# Patient Record
Sex: MALE | Race: WHITE | NOT HISPANIC OR LATINO | Employment: OTHER | URBAN - METROPOLITAN AREA
[De-identification: names, ages, dates, MRNs, and addresses within clinical notes are randomized per-mention and may not be internally consistent; named-entity substitution may affect disease eponyms.]

---

## 2017-01-13 ENCOUNTER — HOSPITAL ENCOUNTER (OUTPATIENT)
Dept: NUCLEAR MEDICINE | Facility: HOSPITAL | Age: 62
Discharge: HOME | End: 2017-01-13
Attending: INTERNAL MEDICINE | Admitting: INTERNAL MEDICINE

## 2019-04-04 ENCOUNTER — HOSPITAL ENCOUNTER (OUTPATIENT)
Dept: GENERAL RADIOLOGY | Facility: HOSPITAL | Age: 64
Discharge: HOME | End: 2019-04-04
Attending: PAIN MEDICINE | Admitting: PAIN MEDICINE

## 2019-11-25 ENCOUNTER — TRANSCRIBE ORDERS (OUTPATIENT)
Dept: ADMINISTRATIVE | Facility: HOSPITAL | Age: 64
End: 2019-11-25

## 2019-11-25 DIAGNOSIS — M47.816 LUMBAR SPONDYLOSIS: ICD-10-CM

## 2019-11-25 DIAGNOSIS — M54.16 LUMBAR RADICULOPATHY: Primary | ICD-10-CM

## 2020-11-03 ENCOUNTER — HOSPITAL ENCOUNTER (INPATIENT)
Facility: HOSPITAL | Age: 65
LOS: 3 days | Discharge: HOME | End: 2020-11-06
Attending: EMERGENCY MEDICINE | Admitting: HOSPITALIST

## 2020-11-03 ENCOUNTER — APPOINTMENT (OUTPATIENT)
Dept: CT IMAGING | Facility: HOSPITAL | Age: 65
End: 2020-11-03

## 2020-11-03 ENCOUNTER — APPOINTMENT (OUTPATIENT)
Dept: GENERAL RADIOLOGY | Facility: HOSPITAL | Age: 65
End: 2020-11-03

## 2020-11-03 DIAGNOSIS — N17.9 SEPSIS WITH ACUTE RENAL FAILURE WITHOUT SEPTIC SHOCK, DUE TO UNSPECIFIED ORGANISM, UNSPECIFIED ACUTE RENAL FAILURE TYPE (HCC): ICD-10-CM

## 2020-11-03 DIAGNOSIS — I24.0 ACUTE CORONARY THROMBOSIS NOT RESULTING IN MYOCARDIAL INFARCTION (HCC): ICD-10-CM

## 2020-11-03 DIAGNOSIS — N17.9 AKI (ACUTE KIDNEY INJURY) (HCC): Primary | ICD-10-CM

## 2020-11-03 DIAGNOSIS — R65.20 SEPSIS WITH ACUTE RENAL FAILURE WITHOUT SEPTIC SHOCK, DUE TO UNSPECIFIED ORGANISM, UNSPECIFIED ACUTE RENAL FAILURE TYPE (HCC): ICD-10-CM

## 2020-11-03 DIAGNOSIS — I48.20 CHRONIC ATRIAL FIBRILLATION (HCC): ICD-10-CM

## 2020-11-03 DIAGNOSIS — A41.9 SEPSIS WITH ACUTE RENAL FAILURE WITHOUT SEPTIC SHOCK, DUE TO UNSPECIFIED ORGANISM, UNSPECIFIED ACUTE RENAL FAILURE TYPE (HCC): ICD-10-CM

## 2020-11-03 DIAGNOSIS — U07.1 COVID-19 VIRUS INFECTION: ICD-10-CM

## 2020-11-03 DIAGNOSIS — R77.8 ELEVATED TROPONIN: ICD-10-CM

## 2020-11-03 PROBLEM — M62.82 RHABDOMYOLYSIS: Status: ACTIVE | Noted: 2020-11-03

## 2020-11-03 LAB
ALBUMIN SERPL-MCNC: 4.8 G/DL (ref 3.5–5.2)
ALBUMIN/GLOB SERPL: 1.7 G/DL
ALP SERPL-CCNC: 83 U/L (ref 39–117)
ALT SERPL W P-5'-P-CCNC: 16 U/L (ref 1–41)
AMORPH URATE CRY URNS QL MICRO: ABNORMAL /HPF
AMPHET+METHAMPHET UR QL: NEGATIVE
ANION GAP SERPL CALCULATED.3IONS-SCNC: 16.8 MMOL/L (ref 5–15)
APAP SERPL-MCNC: <5 MCG/ML (ref 0–30)
AST SERPL-CCNC: 37 U/L (ref 1–40)
B PARAPERT DNA SPEC QL NAA+PROBE: NOT DETECTED
B PERT DNA SPEC QL NAA+PROBE: NOT DETECTED
BACTERIA UR QL AUTO: ABNORMAL /HPF
BARBITURATES UR QL SCN: NEGATIVE
BASOPHILS # BLD AUTO: 0.01 10*3/MM3 (ref 0–0.2)
BASOPHILS NFR BLD AUTO: 0.1 % (ref 0–1.5)
BENZODIAZ UR QL SCN: NEGATIVE
BILIRUB SERPL-MCNC: 0.3 MG/DL (ref 0–1.2)
BILIRUB UR QL STRIP: NEGATIVE
BUN SERPL-MCNC: 48 MG/DL (ref 8–23)
BUN/CREAT SERPL: 11.9 (ref 7–25)
C PNEUM DNA NPH QL NAA+NON-PROBE: NOT DETECTED
CALCIUM SPEC-SCNC: 9 MG/DL (ref 8.6–10.5)
CANNABINOIDS SERPL QL: NEGATIVE
CHLORIDE SERPL-SCNC: 97 MMOL/L (ref 98–107)
CK SERPL-CCNC: 1588 U/L (ref 20–200)
CLARITY UR: ABNORMAL
CO2 SERPL-SCNC: 24.2 MMOL/L (ref 22–29)
COCAINE UR QL: NEGATIVE
COLOR UR: YELLOW
CREAT SERPL-MCNC: 4.03 MG/DL (ref 0.76–1.27)
D DIMER PPP FEU-MCNC: 0.75 MCGFEU/ML (ref 0–0.49)
D-LACTATE SERPL-SCNC: 1.2 MMOL/L (ref 0.5–2)
D-LACTATE SERPL-SCNC: 2.1 MMOL/L (ref 0.5–2)
DEPRECATED RDW RBC AUTO: 47.2 FL (ref 37–54)
EOSINOPHIL # BLD AUTO: 0.02 10*3/MM3 (ref 0–0.4)
EOSINOPHIL NFR BLD AUTO: 0.3 % (ref 0.3–6.2)
ERYTHROCYTE [DISTWIDTH] IN BLOOD BY AUTOMATED COUNT: 15.2 % (ref 12.3–15.4)
ETHANOL BLD-MCNC: <10 MG/DL (ref 0–10)
ETHANOL UR QL: <0.01 %
FERRITIN SERPL-MCNC: 54.3 NG/ML (ref 30–400)
FLUAV H1 2009 PAND RNA NPH QL NAA+PROBE: NOT DETECTED
FLUAV H1 HA GENE NPH QL NAA+PROBE: NOT DETECTED
FLUAV H3 RNA NPH QL NAA+PROBE: NOT DETECTED
FLUAV SUBTYP SPEC NAA+PROBE: NOT DETECTED
FLUBV RNA ISLT QL NAA+PROBE: NOT DETECTED
GFR SERPL CREATININE-BSD FRML MDRD: 15 ML/MIN/1.73
GLOBULIN UR ELPH-MCNC: 2.8 GM/DL
GLUCOSE BLDC GLUCOMTR-MCNC: 138 MG/DL (ref 70–130)
GLUCOSE SERPL-MCNC: 102 MG/DL (ref 65–99)
GLUCOSE UR STRIP-MCNC: NEGATIVE MG/DL
HADV DNA SPEC NAA+PROBE: NOT DETECTED
HBA1C MFR BLD: 4.74 % (ref 4.8–5.6)
HCOV 229E RNA SPEC QL NAA+PROBE: NOT DETECTED
HCOV HKU1 RNA SPEC QL NAA+PROBE: NOT DETECTED
HCOV NL63 RNA SPEC QL NAA+PROBE: NOT DETECTED
HCOV OC43 RNA SPEC QL NAA+PROBE: NOT DETECTED
HCT VFR BLD AUTO: 31.8 % (ref 37.5–51)
HGB BLD-MCNC: 10 G/DL (ref 13–17.7)
HGB UR QL STRIP.AUTO: ABNORMAL
HMPV RNA NPH QL NAA+NON-PROBE: NOT DETECTED
HPIV1 RNA SPEC QL NAA+PROBE: NOT DETECTED
HPIV2 RNA SPEC QL NAA+PROBE: NOT DETECTED
HPIV3 RNA NPH QL NAA+PROBE: NOT DETECTED
HPIV4 P GENE NPH QL NAA+PROBE: NOT DETECTED
HYALINE CASTS UR QL AUTO: ABNORMAL /LPF
IMM GRANULOCYTES # BLD AUTO: 0.03 10*3/MM3 (ref 0–0.05)
IMM GRANULOCYTES NFR BLD AUTO: 0.4 % (ref 0–0.5)
KETONES UR QL STRIP: ABNORMAL
LACTATE HOLD SPECIMEN: NORMAL
LDH SERPL-CCNC: 258 U/L (ref 135–225)
LEUKOCYTE ESTERASE UR QL STRIP.AUTO: NEGATIVE
LYMPHOCYTES # BLD AUTO: 0.74 10*3/MM3 (ref 0.7–3.1)
LYMPHOCYTES NFR BLD AUTO: 10 % (ref 19.6–45.3)
M PNEUMO IGG SER IA-ACNC: NOT DETECTED
MCH RBC QN AUTO: 26.7 PG (ref 26.6–33)
MCHC RBC AUTO-ENTMCNC: 31.4 G/DL (ref 31.5–35.7)
MCV RBC AUTO: 84.8 FL (ref 79–97)
METHADONE UR QL SCN: NEGATIVE
MONOCYTES # BLD AUTO: 0.44 10*3/MM3 (ref 0.1–0.9)
MONOCYTES NFR BLD AUTO: 6 % (ref 5–12)
NEUTROPHILS NFR BLD AUTO: 6.14 10*3/MM3 (ref 1.7–7)
NEUTROPHILS NFR BLD AUTO: 83.2 % (ref 42.7–76)
NITRITE UR QL STRIP: NEGATIVE
NRBC BLD AUTO-RTO: 0 /100 WBC (ref 0–0.2)
NT-PROBNP SERPL-MCNC: 2922 PG/ML (ref 0–900)
OPIATES UR QL: NEGATIVE
OXYCODONE UR QL SCN: POSITIVE
PH UR STRIP.AUTO: <=5 [PH] (ref 5–8)
PLATELET # BLD AUTO: 254 10*3/MM3 (ref 140–450)
PMV BLD AUTO: 10.5 FL (ref 6–12)
POTASSIUM SERPL-SCNC: 3.6 MMOL/L (ref 3.5–5.2)
PROCALCITONIN SERPL-MCNC: 1.55 NG/ML (ref 0–0.25)
PROCALCITONIN SERPL-MCNC: 1.65 NG/ML (ref 0–0.25)
PROT SERPL-MCNC: 7.6 G/DL (ref 6–8.5)
PROT UR QL STRIP: ABNORMAL
QT INTERVAL: 292 MS
RBC # BLD AUTO: 3.75 10*6/MM3 (ref 4.14–5.8)
RBC # UR: ABNORMAL /HPF
REF LAB TEST METHOD: ABNORMAL
RHINOVIRUS RNA SPEC NAA+PROBE: NOT DETECTED
RSV RNA NPH QL NAA+NON-PROBE: NOT DETECTED
SALICYLATES SERPL-MCNC: <0.3 MG/DL
SARS-COV-2 RNA NPH QL NAA+NON-PROBE: DETECTED
SODIUM SERPL-SCNC: 138 MMOL/L (ref 136–145)
SP GR UR STRIP: 1.02 (ref 1–1.03)
SQUAMOUS #/AREA URNS HPF: ABNORMAL /HPF
TROPONIN T SERPL-MCNC: 0.29 NG/ML (ref 0–0.03)
TROPONIN T SERPL-MCNC: 0.36 NG/ML (ref 0–0.03)
TROPONIN T SERPL-MCNC: 0.37 NG/ML (ref 0–0.03)
UROBILINOGEN UR QL STRIP: ABNORMAL
WBC # BLD AUTO: 7.38 10*3/MM3 (ref 3.4–10.8)
WBC UR QL AUTO: ABNORMAL /HPF

## 2020-11-03 PROCEDURE — 82962 GLUCOSE BLOOD TEST: CPT

## 2020-11-03 PROCEDURE — 87086 URINE CULTURE/COLONY COUNT: CPT | Performed by: EMERGENCY MEDICINE

## 2020-11-03 PROCEDURE — 80053 COMPREHEN METABOLIC PANEL: CPT | Performed by: EMERGENCY MEDICINE

## 2020-11-03 PROCEDURE — 99285 EMERGENCY DEPT VISIT HI MDM: CPT

## 2020-11-03 PROCEDURE — 63710000001 DEXAMETHASONE PER 0.25 MG: Performed by: HOSPITALIST

## 2020-11-03 PROCEDURE — 80307 DRUG TEST PRSMV CHEM ANLYZR: CPT | Performed by: EMERGENCY MEDICINE

## 2020-11-03 PROCEDURE — 83036 HEMOGLOBIN GLYCOSYLATED A1C: CPT | Performed by: HOSPITALIST

## 2020-11-03 PROCEDURE — 84484 ASSAY OF TROPONIN QUANT: CPT | Performed by: HOSPITALIST

## 2020-11-03 PROCEDURE — 25010000002 HEPARIN (PORCINE) PER 1000 UNITS: Performed by: HOSPITALIST

## 2020-11-03 PROCEDURE — 94799 UNLISTED PULMONARY SVC/PX: CPT

## 2020-11-03 PROCEDURE — 83615 LACTATE (LD) (LDH) ENZYME: CPT | Performed by: HOSPITALIST

## 2020-11-03 PROCEDURE — 85025 COMPLETE CBC W/AUTO DIFF WBC: CPT | Performed by: EMERGENCY MEDICINE

## 2020-11-03 PROCEDURE — 84484 ASSAY OF TROPONIN QUANT: CPT | Performed by: EMERGENCY MEDICINE

## 2020-11-03 PROCEDURE — 83880 ASSAY OF NATRIURETIC PEPTIDE: CPT | Performed by: EMERGENCY MEDICINE

## 2020-11-03 PROCEDURE — 94640 AIRWAY INHALATION TREATMENT: CPT

## 2020-11-03 PROCEDURE — 82550 ASSAY OF CK (CPK): CPT | Performed by: EMERGENCY MEDICINE

## 2020-11-03 PROCEDURE — 93005 ELECTROCARDIOGRAM TRACING: CPT | Performed by: EMERGENCY MEDICINE

## 2020-11-03 PROCEDURE — 83605 ASSAY OF LACTIC ACID: CPT | Performed by: EMERGENCY MEDICINE

## 2020-11-03 PROCEDURE — 36415 COLL VENOUS BLD VENIPUNCTURE: CPT | Performed by: HOSPITALIST

## 2020-11-03 PROCEDURE — 0202U NFCT DS 22 TRGT SARS-COV-2: CPT | Performed by: EMERGENCY MEDICINE

## 2020-11-03 PROCEDURE — 74176 CT ABD & PELVIS W/O CONTRAST: CPT

## 2020-11-03 PROCEDURE — 85379 FIBRIN DEGRADATION QUANT: CPT | Performed by: HOSPITALIST

## 2020-11-03 PROCEDURE — 93010 ELECTROCARDIOGRAM REPORT: CPT | Performed by: INTERNAL MEDICINE

## 2020-11-03 PROCEDURE — 25010000003 CEFTRIAXONE PER 250 MG: Performed by: EMERGENCY MEDICINE

## 2020-11-03 PROCEDURE — 87040 BLOOD CULTURE FOR BACTERIA: CPT | Performed by: EMERGENCY MEDICINE

## 2020-11-03 PROCEDURE — 71045 X-RAY EXAM CHEST 1 VIEW: CPT

## 2020-11-03 PROCEDURE — 84145 PROCALCITONIN (PCT): CPT | Performed by: HOSPITALIST

## 2020-11-03 PROCEDURE — 81001 URINALYSIS AUTO W/SCOPE: CPT | Performed by: EMERGENCY MEDICINE

## 2020-11-03 PROCEDURE — 82728 ASSAY OF FERRITIN: CPT | Performed by: HOSPITALIST

## 2020-11-03 PROCEDURE — 84145 PROCALCITONIN (PCT): CPT | Performed by: EMERGENCY MEDICINE

## 2020-11-03 RX ORDER — ALBUTEROL SULFATE 90 UG/1
2 AEROSOL, METERED RESPIRATORY (INHALATION) EVERY 6 HOURS PRN
Status: DISCONTINUED | OUTPATIENT
Start: 2020-11-03 | End: 2020-11-06 | Stop reason: HOSPADM

## 2020-11-03 RX ORDER — GABAPENTIN 600 MG/1
TABLET ORAL EVERY 6 HOURS
COMMUNITY
Start: 2017-01-03

## 2020-11-03 RX ORDER — SIMVASTATIN 40 MG
TABLET ORAL
COMMUNITY
Start: 2017-01-03

## 2020-11-03 RX ORDER — CEFTRIAXONE SODIUM 2 G/50ML
2 INJECTION, SOLUTION INTRAVENOUS ONCE
Status: COMPLETED | OUTPATIENT
Start: 2020-11-03 | End: 2020-11-03

## 2020-11-03 RX ORDER — CLONAZEPAM 1 MG/1
TABLET ORAL
COMMUNITY
Start: 2017-01-03

## 2020-11-03 RX ORDER — METOPROLOL SUCCINATE 50 MG/1
TABLET, EXTENDED RELEASE ORAL EVERY 12 HOURS
COMMUNITY
Start: 2018-01-06

## 2020-11-03 RX ORDER — BUDESONIDE AND FORMOTEROL FUMARATE DIHYDRATE 160; 4.5 UG/1; UG/1
2 AEROSOL RESPIRATORY (INHALATION)
Status: DISCONTINUED | OUTPATIENT
Start: 2020-11-03 | End: 2020-11-06 | Stop reason: HOSPADM

## 2020-11-03 RX ORDER — DEXTROSE MONOHYDRATE 25 G/50ML
25 INJECTION, SOLUTION INTRAVENOUS
Status: DISCONTINUED | OUTPATIENT
Start: 2020-11-03 | End: 2020-11-06 | Stop reason: HOSPADM

## 2020-11-03 RX ORDER — SODIUM CHLORIDE 0.9 % (FLUSH) 0.9 %
10 SYRINGE (ML) INJECTION AS NEEDED
Status: DISCONTINUED | OUTPATIENT
Start: 2020-11-03 | End: 2020-11-06 | Stop reason: HOSPADM

## 2020-11-03 RX ORDER — ACETAMINOPHEN 500 MG
1000 TABLET ORAL ONCE
Status: COMPLETED | OUTPATIENT
Start: 2020-11-03 | End: 2020-11-03

## 2020-11-03 RX ORDER — ALBUTEROL SULFATE 90 UG/1
AEROSOL, METERED RESPIRATORY (INHALATION)
COMMUNITY
Start: 2018-09-12

## 2020-11-03 RX ORDER — BUDESONIDE AND FORMOTEROL FUMARATE DIHYDRATE 160; 4.5 UG/1; UG/1
AEROSOL RESPIRATORY (INHALATION)
COMMUNITY
Start: 2017-01-03

## 2020-11-03 RX ORDER — CEFTRIAXONE SODIUM 2 G/50ML
2 INJECTION, SOLUTION INTRAVENOUS EVERY 24 HOURS
Status: DISCONTINUED | OUTPATIENT
Start: 2020-11-04 | End: 2020-11-04

## 2020-11-03 RX ORDER — ACETAMINOPHEN 160 MG/5ML
650 SOLUTION ORAL EVERY 4 HOURS PRN
Status: DISCONTINUED | OUTPATIENT
Start: 2020-11-03 | End: 2020-11-06 | Stop reason: HOSPADM

## 2020-11-03 RX ORDER — SODIUM CHLORIDE 0.9 % (FLUSH) 0.9 %
10 SYRINGE (ML) INJECTION EVERY 12 HOURS SCHEDULED
Status: DISCONTINUED | OUTPATIENT
Start: 2020-11-03 | End: 2020-11-06 | Stop reason: HOSPADM

## 2020-11-03 RX ORDER — ONDANSETRON 4 MG/1
4 TABLET, FILM COATED ORAL EVERY 6 HOURS PRN
Status: DISCONTINUED | OUTPATIENT
Start: 2020-11-03 | End: 2020-11-04 | Stop reason: SDUPTHER

## 2020-11-03 RX ORDER — ACETAMINOPHEN 325 MG/1
650 TABLET ORAL EVERY 4 HOURS PRN
Status: DISCONTINUED | OUTPATIENT
Start: 2020-11-03 | End: 2020-11-06 | Stop reason: HOSPADM

## 2020-11-03 RX ORDER — DILTIAZEM HYDROCHLORIDE 240 MG/1
CAPSULE, COATED, EXTENDED RELEASE ORAL
COMMUNITY
Start: 2017-01-03

## 2020-11-03 RX ORDER — VENLAFAXINE 100 MG/1
TABLET ORAL
COMMUNITY
Start: 2017-01-03

## 2020-11-03 RX ORDER — HEPARIN SODIUM 5000 [USP'U]/ML
5000 INJECTION, SOLUTION INTRAVENOUS; SUBCUTANEOUS EVERY 8 HOURS SCHEDULED
Status: DISCONTINUED | OUTPATIENT
Start: 2020-11-03 | End: 2020-11-06 | Stop reason: HOSPADM

## 2020-11-03 RX ORDER — METOPROLOL SUCCINATE 50 MG/1
50 TABLET, EXTENDED RELEASE ORAL
Status: DISCONTINUED | OUTPATIENT
Start: 2020-11-03 | End: 2020-11-06 | Stop reason: HOSPADM

## 2020-11-03 RX ORDER — ONDANSETRON 2 MG/ML
4 INJECTION INTRAMUSCULAR; INTRAVENOUS EVERY 6 HOURS PRN
Status: DISCONTINUED | OUTPATIENT
Start: 2020-11-03 | End: 2020-11-04 | Stop reason: SDUPTHER

## 2020-11-03 RX ORDER — BUDESONIDE AND FORMOTEROL FUMARATE DIHYDRATE 160; 4.5 UG/1; UG/1
2 AEROSOL RESPIRATORY (INHALATION)
Status: DISCONTINUED | OUTPATIENT
Start: 2020-11-03 | End: 2020-11-03 | Stop reason: SDUPTHER

## 2020-11-03 RX ORDER — DILTIAZEM HYDROCHLORIDE 240 MG/1
240 CAPSULE, COATED, EXTENDED RELEASE ORAL
Status: DISCONTINUED | OUTPATIENT
Start: 2020-11-03 | End: 2020-11-06 | Stop reason: HOSPADM

## 2020-11-03 RX ORDER — ACETAMINOPHEN 650 MG/1
650 SUPPOSITORY RECTAL EVERY 4 HOURS PRN
Status: DISCONTINUED | OUTPATIENT
Start: 2020-11-03 | End: 2020-11-06 | Stop reason: HOSPADM

## 2020-11-03 RX ORDER — SODIUM CHLORIDE 9 MG/ML
100 INJECTION, SOLUTION INTRAVENOUS CONTINUOUS
Status: DISCONTINUED | OUTPATIENT
Start: 2020-11-03 | End: 2020-11-06 | Stop reason: HOSPADM

## 2020-11-03 RX ORDER — ATORVASTATIN CALCIUM 20 MG/1
20 TABLET, FILM COATED ORAL DAILY
Status: DISCONTINUED | OUTPATIENT
Start: 2020-11-03 | End: 2020-11-06 | Stop reason: HOSPADM

## 2020-11-03 RX ORDER — BUMETANIDE 1 MG/1
2 TABLET ORAL EVERY 24 HOURS
COMMUNITY
Start: 2017-12-26 | End: 2020-11-06 | Stop reason: HOSPADM

## 2020-11-03 RX ORDER — NICOTINE POLACRILEX 4 MG
15 LOZENGE BUCCAL
Status: DISCONTINUED | OUTPATIENT
Start: 2020-11-03 | End: 2020-11-06 | Stop reason: HOSPADM

## 2020-11-03 RX ORDER — CYCLOBENZAPRINE HCL 10 MG
TABLET ORAL
COMMUNITY
Start: 2017-01-03

## 2020-11-03 RX ORDER — VENLAFAXINE 75 MG/1
37.5 TABLET ORAL 2 TIMES DAILY WITH MEALS
Status: DISCONTINUED | OUTPATIENT
Start: 2020-11-03 | End: 2020-11-06 | Stop reason: HOSPADM

## 2020-11-03 RX ADMIN — SODIUM CHLORIDE, POTASSIUM CHLORIDE, SODIUM LACTATE AND CALCIUM CHLORIDE 2553 ML: 600; 310; 30; 20 INJECTION, SOLUTION INTRAVENOUS at 09:47

## 2020-11-03 RX ADMIN — ACETAMINOPHEN 1000 MG: 500 TABLET, FILM COATED ORAL at 08:27

## 2020-11-03 RX ADMIN — TIOTROPIUM BROMIDE INHALATION SPRAY 2 PUFF: 3.12 SPRAY, METERED RESPIRATORY (INHALATION) at 20:44

## 2020-11-03 RX ADMIN — SODIUM CHLORIDE, PRESERVATIVE FREE 10 ML: 5 INJECTION INTRAVENOUS at 20:53

## 2020-11-03 RX ADMIN — SODIUM CHLORIDE 100 ML/HR: 9 INJECTION, SOLUTION INTRAVENOUS at 22:09

## 2020-11-03 RX ADMIN — CEFTRIAXONE SODIUM 2 G: 2 INJECTION, SOLUTION INTRAVENOUS at 09:58

## 2020-11-03 RX ADMIN — BUDESONIDE AND FORMOTEROL FUMARATE DIHYDRATE 2 PUFF: 160; 4.5 AEROSOL RESPIRATORY (INHALATION) at 20:44

## 2020-11-03 RX ADMIN — HEPARIN SODIUM 5000 UNITS: 5000 INJECTION INTRAVENOUS; SUBCUTANEOUS at 21:06

## 2020-11-03 NOTE — ED NOTES
Patient was placed in face mask during first look triage.  Patient was wearing a face mask throughout encounter.  I wore personal protective equipment throughout the encounter.  Hand hygiene was performed before and after patient encounter.        Cathy Nguyễn RN  11/03/20 2003

## 2020-11-03 NOTE — H&P
HISTORY AND PHYSICAL   [facility]        Patient Identification:  Name: Mulugeta Falcon  Age: 65 y.o.  Sex: male  :  1955  MRN: [id number]                     Primary Care Physician: Provider, No Known    Chief Complaint: Altered mental status    History of Present Illness:          The patient is a 65-year-old white male with history of A. fib, COPD, diabetes type 2, degenerative disc disease, hypertension and history of some sort of renal disorder who is admitted with history of being found in his car sleeping in having altered mental status and EMS noted he was hypoxic with O2 sats in the 70s and blood sugar of 55.  The patient was brought to the ER for further evaluation also noted to be in renal failure with creatinine of 4.  He has been a little short of air and coughing some.  Patient also had elevated troponin and elevated CPK level.  The patient is confused is difficult to get much more useful information from him.  The patient was admitted for further treatment.    Past Medical History:  Past Medical History:   Diagnosis Date   • A-fib (CMS/HCC)    • COPD (chronic obstructive pulmonary disease) (CMS/HCC)    • Diabetes mellitus (CMS/HCC)     type 2   • Emphysema lung (CMS/HCC)    • Herniated disc, cervical    • Hypertension    • Hypoxia [id number]   • Renal disorder      Past Surgical History:  History reviewed. No pertinent surgical history.   Home Meds:  (Not in a hospital admission)    Current meds    Current Facility-Administered Medications:   •  acetaminophen (TYLENOL) tablet 650 mg, 650 mg, Oral, Q4H PRN **OR** acetaminophen (TYLENOL) 160 MG/5ML solution 650 mg, 650 mg, Oral, Q4H PRN **OR** acetaminophen (TYLENOL) suppository 650 mg, 650 mg, Rectal, Q4H PRN, Malik Barnard MD  •  [START ON 2020] cefTRIAXone (ROCEPHIN) IVPB 2 g, 2 g, Intravenous, Q24H, Malik Barnard MD  •  dexamethasone (DECADRON) tablet 6 mg, 6 mg, Oral, Daily With Breakfast, Malik Barnard MD  •  heparin  (porcine) 5000 UNIT/ML injection 5,000 Units, 5,000 Units, Subcutaneous, Q8H, Malik Barnard MD  •  ondansetron (ZOFRAN) tablet 4 mg, 4 mg, Oral, Q6H PRN **OR** ondansetron (ZOFRAN) injection 4 mg, 4 mg, Intravenous, Q6H PRN, Malik Barnard MD  •  [COMPLETED] Insert peripheral IV, , , Once **AND** sodium chloride 0.9 % flush 10 mL, 10 mL, Intravenous, PRN, Balaji Rodarte MD  •  sodium chloride 0.9 % flush 10 mL, 10 mL, Intravenous, Q12H, Malik Barnard MD  •  sodium chloride 0.9 % flush 10 mL, 10 mL, Intravenous, PRN, Malik Barnard MD  •  sodium chloride 0.9 % flush 10 mL, 10 mL, Intravenous, Q12H, Malik Barnard MD  •  sodium chloride 0.9 % flush 10 mL, 10 mL, Intravenous, PRN, Malik Barnard MD  •  sodium chloride 0.9 % infusion, 100 mL/hr, Intravenous, Continuous, Malik Barnard MD    Current Outpatient Medications:   •  albuterol sulfate HFA (Ventolin HFA) 108 (90 Base) MCG/ACT inhaler, VENTOLIN  (90 Base) MCG/ACT AERS, Disp: , Rfl:   •  budesonide-formoterol (Symbicort) 160-4.5 MCG/ACT inhaler, SYMBICORT 160-4.5 MCG/ACT AERO, Disp: , Rfl:   •  bumetanide (BUMEX) 1 MG tablet, Daily., Disp: , Rfl:   •  clonazePAM (KlonoPIN) 1 MG tablet, CLONAZEPAM 1 MG TABS, Disp: , Rfl:   •  cyclobenzaprine (FLEXERIL) 10 MG tablet, CYCLOBENZAPRINE HCL 10 MG TABS, Disp: , Rfl:   •  dilTIAZem CD (Cartia XT) 240 MG 24 hr capsule, CARTIA  MG XR24H-CAP, Disp: , Rfl:   •  gabapentin (NEURONTIN) 600 MG tablet, Every 6 (Six) Hours., Disp: , Rfl:   •  metFORMIN (GLUCOPHAGE) 1000 MG tablet, METFORMIN HCL 1000 MG TABS, Disp: , Rfl:   •  methotrexate 2.5 MG tablet, METHOTREXATE 2.5 MG TABS, Disp: , Rfl:   •  metoprolol succinate XL (TOPROL-XL) 50 MG 24 hr tablet, Every 12 (Twelve) Hours., Disp: , Rfl:   •  mometasone-formoterol (Dulera) 100-5 MCG/ACT inhaler, DULERA 100-5 MCG/ACT AERO, Disp: , Rfl:   •  simvastatin (ZOCOR) 40 MG tablet, SIMVASTATIN 40 MG TABS, Disp: , Rfl:   •  venlafaxine (EFFEXOR) 100 MG tablet,  "VENLAFAXINE  MG TABS, Disp: , Rfl:   Allergies:  No Known Allergies  Immunizations:    There is no immunization history on file for this patient.  Social History:   Social History     Social History Narrative   • Not on file     Social History     Socioeconomic History   • Marital status: Legally      Spouse name: Not on file   • Number of children: Not on file   • Years of education: Not on file   • Highest education level: Not on file   Tobacco Use   • Smoking status: Former Smoker   • Smokeless tobacco: Never Used   Substance and Sexual Activity   • Alcohol use: Not Currently     Comment: drank for 20 years, sober now    • Drug use: Never   • Sexual activity: Defer       Family History:  History reviewed. No pertinent family history.     Review of Systems  See history of present illness and past medical history.  Patient denies headache, dizziness, syncope, falls, trauma, change in vision, change in hearing, change in taste, changes in weight, changes in appetite, focal weakness, numbness, or paresthesia.  Patient denies chest pain, palpitations, dyspnea, orthopnea, PND, cough, sinus pressure, rhinorrhea, epistaxis, hemoptysis, nausea, vomiting, hematemesis, diarrhea, constipation or hematchezia.  Denies cold or heat intolerance, polydipsia, polyuria, polyphagia. Denies hematuria, pyuria, dysuria, hesitancy, frequency or urgency.    Remainder of ROS is negative.    Objective:  tMax 24 hrs: Temp (24hrs), Av °F (38.3 °C), Min:100.9 °F (38.3 °C), Max:101.2 °F (38.4 °C)    Vitals Ranges:   Temp:  [100.9 °F (38.3 °C)-101.2 °F (38.4 °C)] 101 °F (38.3 °C)  Heart Rate:  [] 118  Resp:  [18] 18  BP: ()/(50-74) 111/74      Exam:  /74   Pulse 118   Temp (!) 101 °F (38.3 °C) (Oral)   Resp 18   Ht 157.5 cm (62\")   Wt 85.1 kg (187 lb 11.2 oz)   SpO2 100%   BMI 34.33 kg/m²     General Appearance:    Alert, cooperative, no distress, appears stated age   Head:    Normocephalic, without " obvious abnormality, atraumatic   Eyes:    PERRL, conjunctivae/corneas clear, EOM's intact, both eyes   Ears:    Normal external ear canals, both ears   Nose:   Nares normal, septum midline, mucosa normal, no drainage    or sinus tenderness   Throat:   Lips, mucosa, and tongue normal   Neck:   Supple, symmetrical, trachea midline, no adenopathy;     thyroid:  no enlargement/tenderness/nodules; no carotid    bruit or JVD   Back:     Symmetric, no curvature, ROM normal, no CVA tenderness   Lungs:     Clear to auscultation bilaterally, respirations unlabored   Chest Wall:    No tenderness or deformity    Heart:    Regular rate and rhythm, S1 and S2 normal, no murmur, rub   or gallop   Abdomen:     Soft, nontender, bowel sounds active all four quadrants,     no masses, no hepatomegaly, no splenomegaly   Extremities:   Extremities normal, atraumatic, no cyanosis or edema   Pulses:   2+ and symmetric all extremities   Skin:   Skin color, texture, turgor normal, no rashes or lesions   Lymph nodes:   Cervical, supraclavicular, and axillary nodes normal   Neurologic:   CNII-XII intact, normal strength, sensation intact throughout      .    Data Review:  Lab Results (last 72 hours)     Procedure Component Value Units Date/Time    Lactic Acid, Reflex [[id number]]  (Normal) Collected: 11/03/20 1409    Specimen: Blood Updated: 11/03/20 1503     Lactate 1.2 mmol/L     Lactic Acid, Reflex Timer (This will reflex a repeat order 3-3:15 hours after ordered.) [[id number]] Collected: 11/03/20 0825    Specimen: Blood Updated: 11/03/20 1200     Hold Tube Hold for add-ons.     Comment: Auto resulted.       Troponin [[id number]]  (Abnormal) Collected: 11/03/20 0952    Specimen: Blood Updated: 11/03/20 1134     Troponin T 0.291 ng/mL     Narrative:      Troponin T Reference Range:  <= 0.03 ng/mL-   Negative for AMI  >0.03 ng/mL-     Abnormal for myocardial necrosis.  Clinicians would have to utilize clinical acumen, EKG, Troponin and serial  changes to determine if it is an Acute Myocardial Infarction or myocardial injury due to an underlying chronic condition.       Results may be falsely decreased if patient taking Biotin.      Urinalysis, Microscopic Only - Urine, Clean Catch [[id number]]  (Abnormal) Collected: 11/03/20 0813    Specimen: Urine, Clean Catch Updated: 11/03/20 1028     RBC, UA 0-2 /HPF      WBC, UA 6-12 /HPF      Bacteria, UA 1+ /HPF      Squamous Epithelial Cells, UA 0-2 /HPF      Hyaline Casts, UA None Seen /LPF      Amorphous Crystals, UA Small/1+ /HPF      Methodology Manual Light Microscopy    Urine Culture - Urine, Urine, Clean Catch [[id number]] Collected: 11/03/20 0813    Specimen: Urine, Clean Catch Updated: 11/03/20 1028    Respiratory Panel PCR w/COVID-19(SARS-CoV-2) ANH/EFRAIN/GEO/PAD/COR/MAD/PACHECO In-House, NP Swab in UTM/VTM, 3-4 HR TAT - Swab, Nasopharynx [[id number]]  (Abnormal) Collected: 11/03/20 0825    Specimen: Swab from Nasopharynx Updated: 11/03/20 1009     ADENOVIRUS, PCR Not Detected     Coronavirus 229E Not Detected     Coronavirus HKU1 Not Detected     Coronavirus NL63 Not Detected     Coronavirus OC43 Not Detected     COVID19 Detected     Human Metapneumovirus Not Detected     Human Rhinovirus/Enterovirus Not Detected     Influenza A PCR Not Detected     Influenza A H1 Not Detected     Influenza A H1 2009 PCR Not Detected     Influenza A H3 Not Detected     Influenza B PCR Not Detected     Parainfluenza Virus 1 Not Detected     Parainfluenza Virus 2 Not Detected     Parainfluenza Virus 3 Not Detected     Parainfluenza Virus 4 Not Detected     RSV, PCR Not Detected     Bordetella pertussis pcr Not Detected     Bordetella parapertussis PCR Not Detected     Chlamydophila pneumoniae PCR Not Detected     Mycoplasma pneumo by PCR Not Detected    Narrative:      Fact sheet for providers: https://docs.[employer]/wp-content/uploads/TQQ8952-3839-UG9.1-EUA-Provider-Fact-Sheet-3.pdf    Fact sheet for patients:  "https://docs.[employer]/wp-content/uploads/YXA2941-9290-AY6.1-EUA-Patient-Fact-Sheet-1.pdf    Urinalysis With Culture If Indicated - Urine, Clean Catch [[id number]]  (Abnormal) Collected: 11/03/20 0813    Specimen: Urine, Clean Catch Updated: 11/03/20 0959     Color, UA Yellow     Appearance, UA Cloudy     pH, UA <=5.0     Specific Gravity, UA 1.016     Glucose, UA Negative     Ketones, UA Trace     Bilirubin, UA Negative     Blood, UA Moderate (2+)     Protein, UA 30 mg/dL (1+)     Leuk Esterase, UA Negative     Nitrite, UA Negative     Urobilinogen, UA 0.2 E.U./dL    CK [[id number]]  (Abnormal) Collected: 11/03/20 0825    Specimen: Blood Updated: 11/03/20 0951     Creatine Kinase 1,588 U/L     Procalcitonin [[id number]]  (Abnormal) Collected: 11/03/20 0825    Specimen: Blood Updated: 11/03/20 0924     Procalcitonin 1.65 ng/mL     Narrative:      As a Marker for Sepsis (Non-Neonates):   1. <0.5 ng/mL represents a low risk of severe sepsis and/or septic shock.  1. >2 ng/mL represents a high risk of severe sepsis and/or septic shock.    As a Marker for Lower Respiratory Tract Infections that require antibiotic therapy:  PCT on Admission     Antibiotic Therapy             6-12 Hrs later  > 0.5                Strongly Recommended            >0.25 - <0.5         Recommended  0.1 - 0.25           Discouraged                   Remeasure/reassess PCT  <0.1                 Strongly Discouraged          Remeasure/reassess PCT      As 28 day mortality risk marker: \"Change in Procalcitonin Result\" (> 80 % or <=80 %) if Day 0 (or Day 1) and Day 4 values are available. Refer to http://www.[facility]-pct-calculator.com/   Change in PCT <=80 %   A decrease of PCT levels below or equal to 80 % defines a positive change in PCT test result representing a higher risk for 28-day all-cause mortality of patients diagnosed with severe sepsis or septic shock.  Change in PCT > 80 %   A decrease of PCT levels of more than 80 % defines a negative " change in PCT result representing a lower risk for 28-day all-cause mortality of patients diagnosed with severe sepsis or septic shock.                Results may be falsely decreased if patient taking Biotin.     Troponin [[id number]]  (Abnormal) Collected: 11/03/20 0825    Specimen: Blood Updated: 11/03/20 0921     Troponin T 0.369 ng/mL     Narrative:      Troponin T Reference Range:  <= 0.03 ng/mL-   Negative for AMI  >0.03 ng/mL-     Abnormal for myocardial necrosis.  Clinicians would have to utilize clinical acumen, EKG, Troponin and serial changes to determine if it is an Acute Myocardial Infarction or myocardial injury due to an underlying chronic condition.       Results may be falsely decreased if patient taking Biotin.      Comprehensive Metabolic Panel [[id number]]  (Abnormal) Collected: 11/03/20 0825    Specimen: Blood Updated: 11/03/20 0920     Glucose 102 mg/dL      BUN 48 mg/dL      Creatinine 4.03 mg/dL      Sodium 138 mmol/L      Potassium 3.6 mmol/L      Chloride 97 mmol/L      CO2 24.2 mmol/L      Calcium 9.0 mg/dL      Total Protein 7.6 g/dL      Albumin 4.80 g/dL      ALT (SGPT) 16 U/L      AST (SGOT) 37 U/L      Alkaline Phosphatase 83 U/L      Total Bilirubin 0.3 mg/dL      eGFR Non African Amer 15 mL/min/1.73      Globulin 2.8 gm/dL      A/G Ratio 1.7 g/dL      BUN/Creatinine Ratio 11.9     Anion Gap 16.8 mmol/L     Narrative:      GFR Normal >60  Chronic Kidney Disease <60  Kidney Failure <15      Acetaminophen Level [[id number]]  (Normal) Collected: 11/03/20 0825    Specimen: Blood Updated: 11/03/20 0920     Acetaminophen <5.0 mcg/mL     Ethanol [[id number]] Collected: 11/03/20 0825    Specimen: Blood Updated: 11/03/20 0920     Ethanol <10 mg/dL      Ethanol % <0.010 %     Salicylate Level [[id number]]  (Normal) Collected: 11/03/20 0825    Specimen: Blood Updated: 11/03/20 0920     Salicylate <0.3 mg/dL     Narrative:      Therapeutic range for Salicylates:  3.0 - 10.0 mg/dL for  antipyretic/analgesic conditions  15.0 - 30.0 mg/dL for anti-inflammatory conditions    BNP [[id number]]  (Abnormal) Collected: 11/03/20 0825    Specimen: Blood Updated: 11/03/20 0917     proBNP 2,922.0 pg/mL     Narrative:      Among patients with dyspnea, NT-proBNP is highly sensitive for the detection of acute congestive heart failure. In addition NT-proBNP of <300 pg/ml effectively rules out acute congestive heart failure with 99% negative predictive value.    Results may be falsely decreased if patient taking Biotin.      Lactic Acid, Plasma [[id number]]  (Abnormal) Collected: 11/03/20 0825    Specimen: Blood Updated: 11/03/20 0859     Lactate 2.1 mmol/L     Urine Drug Screen - Urine, Clean Catch [[id number]]  (Abnormal) Collected: 11/03/20 0813    Specimen: Urine, Clean Catch Updated: 11/03/20 0856     Amphet/Methamphet, Screen Negative     Barbiturates Screen, Urine Negative     Benzodiazepine Screen, Urine Negative     Cocaine Screen, Urine Negative     Opiate Screen Negative     THC, Screen, Urine Negative     Methadone Screen, Urine Negative     Oxycodone Screen, Urine Positive    Narrative:      Negative Thresholds For Drugs Screened:     Amphetamines               500 ng/ml   Barbiturates               200 ng/ml   Benzodiazepines            100 ng/ml   Cocaine                    300 ng/ml   Methadone                  300 ng/ml   Opiates                    300 ng/ml   Oxycodone                  100 ng/ml   THC                        50 ng/ml    The Normal Value for all drugs tested is negative. This report includes final unconfirmed screening results to be used for medical treatment purposes only. Unconfirmed results must not be used for non-medical purposes such as employment or legal testing. Clinical consideration should be applied to any drug of abuse test, particulary when unconfirmed results are used.    CBC & Differential [[id number]]  (Abnormal) Collected: 11/03/20 0825    Specimen: Blood Updated:  11/03/20 0851    Narrative:      The following orders were created for panel order CBC & Differential.  Procedure                               Abnormality         Status                     ---------                               -----------         ------                     CBC Auto Differential[[id number]]        Abnormal            Final result                 Please view results for these tests on the individual orders.    CBC Auto Differential [[id number]]  (Abnormal) Collected: 11/03/20 0825    Specimen: Blood Updated: 11/03/20 0851     WBC 7.38 10*3/mm3      RBC 3.75 10*6/mm3      Hemoglobin 10.0 g/dL      Hematocrit 31.8 %      MCV 84.8 fL      MCH 26.7 pg      MCHC 31.4 g/dL      RDW 15.2 %      RDW-SD 47.2 fl      MPV 10.5 fL      Platelets 254 10*3/mm3      Neutrophil % 83.2 %      Lymphocyte % 10.0 %      Monocyte % 6.0 %      Eosinophil % 0.3 %      Basophil % 0.1 %      Immature Grans % 0.4 %      Neutrophils, Absolute 6.14 10*3/mm3      Lymphocytes, Absolute 0.74 10*3/mm3      Monocytes, Absolute 0.44 10*3/mm3      Eosinophils, Absolute 0.02 10*3/mm3      Basophils, Absolute 0.01 10*3/mm3      Immature Grans, Absolute 0.03 10*3/mm3      nRBC 0.0 /100 WBC     Blood Culture - Blood, Arm, Left [[id number]] Collected: 11/03/20 0836    Specimen: Blood from Arm, Left Updated: 11/03/20 0843    Blood Culture - Blood, Arm, Left [[id number]] Collected: 11/03/20 0825    Specimen: Blood from Arm, Left Updated: 11/03/20 0843                   Imaging Results (All)     Procedure Component Value Units Date/Time    CT Abdomen Pelvis Without Contrast [[id number]] Collected: 11/03/20 1153     Updated: 11/03/20 1153    Narrative:      CT ABDOMEN AND PELVIS WITHOUT CONTRAST     HISTORY: Fever, acute renal failure. To assess for calculus.     TECHNIQUE: Axial CT images of the abdomen and pelvis were obtained  without administration of intravenous contrast. The patient was not  given oral contrast. Coronal and sagittal  reformats were obtained.     COMPARISON: None     FINDINGS: Bilateral adrenal glands are normal. Both kidneys are normal  in size and attenuation. Punctate 3 mm calculus is seen within the lower  pole of the right kidney. No hydronephrosis. The right ureter  demonstrates normal caliber. 2 punctate nonobstructing calculi, the  larger measuring 4 mm also seen at the lower pole of the left kidney. No  hydronephrosis. The left ureter demonstrates normal caliber. The urinary  bladder is moderately distended and otherwise unremarkable. Mild diffuse  hepatic steatosis is seen. The gallbladder is distended. The pancreas is  normal without ductal dilatation. The spleen is normal. The small and  large bowel loops demonstrate normal caliber. The appendix is normal. No  pathological retroperitoneal lymphadenopathy. Moderate calcified  atherosclerotic plaque is seen within the abdominal aorta and its  branches. Small fat-containing umbilical hernia is present. Discoid  atelectasis is seen within the right lung base. Degenerative disc  disease is seen in the spine with vacuum disc phenomenon at multiple  levels.       Impression:      Bilateral nonobstructing punctate nephroliths.     Radiation dose reduction techniques were utilized, including automated  exposure control and exposure modulation based on body size.       XR Chest 1 View [[id number]] Collected: 11/03/20 0811     Updated: 11/03/20 0815    Narrative:      XR CHEST 1 VW-     Clinical: Fever and shortness of breath     COMPARISON: None     FINDINGS: Cardiac size upper limits of normal to mildly enlarged. No  edema or effusion is demonstrated. Less than optimal inspiratory effort,  minimal patchy infiltrate or atelectasis demonstrated at the right lung  base. Favor atelectasis. No gross consolidation seen. There are  monitoring leads superimposing the chest, the remainder is unremarkable.     This report was finalized on 11/3/2020 8:12 AM by Dr. Jonathan Wong M.D.             Past Medical History:   Diagnosis Date   • A-fib (CMS/HCC)    • COPD (chronic obstructive pulmonary disease) (CMS/HCC)    • Diabetes mellitus (CMS/HCC)     type 2   • Emphysema lung (CMS/HCC)    • Herniated disc, cervical    • Hypertension    • Hypoxia [id number]   • Renal disorder        Assessment:  Active Hospital Problems    Diagnosis  POA   • **KRISTIE (acute kidney injury) (CMS/[facility]) [N17.9]  Yes   • Rhabdomyolysis [M62.82]  Unknown   • A-fib (CMS/[facility]) [I48.91]  Unknown   • COPD (chronic obstructive pulmonary disease) (CMS/HCC) [J44.9]  Unknown   • Hypertension [I10]  Unknown   • Diabetes mellitus (CMS/[facility]) [E11.9]  Unknown   • Renal disorder [N28.9]  Unknown   • Hypoxia [R09.02]  Unknown   • UTI (urinary tract infection) [N39.0]  Unknown   • Elevated troponin [R77.8]  Unknown   • Pneumonia due to COVID-19 virus [U07.1, J12.89]  Unknown   • Acute respiratory failure with hypoxia (CMS/[facility]) [J96.01]  Unknown      Resolved Hospital Problems   No resolved problems to display.       Plan:  The patient is admitted to the hospital and will ask for renal, pulmonary, infectious disease and cardiology consults.  We will give some IV fluid for hydration and follow-up labs.    Malik Barnard MD  11/3/2020  16:33 EST

## 2020-11-03 NOTE — ED PROVIDER NOTES
EMERGENCY DEPARTMENT ENCOUNTER    Room Number:  10/10  Date seen:  11/3/2020  PCP: Provider, No Known  Historian: Patient, EMS      HPI:  Chief Complaint: Altered mental status  A complete HPI/ROS/PMH/PSH/SH/FH are unobtainable due to: Nothing  Context: Mulugeta Falcon is a 65 y.o. male who presents to the ED for evaluation of altered mental status.  EMS received a call due to a confused man in a vehicle in a parking lot.  Upon EMS arrival they report that the patient would not follow commands, had slurred speech.  When placed on a monitor, oxygen was 76% on room air.  Blood glucose was also 55.  EMS was able to get in touch with his spouse who reports that they have not seen him in a few days.  They also report that he has a history of COPD, TIA, medication noncompliance.  EMS reports that he did have a portable oxygen tank with him but was not utilizing it at this time that they found him.  Patient is amnestic to why he was in his car.  He believes he may have fallen asleep.  He reports that he may have been in the area going to a consignment store.            PAST MEDICAL HISTORY  Active Ambulatory Problems     Diagnosis Date Noted   • No Active Ambulatory Problems     Resolved Ambulatory Problems     Diagnosis Date Noted   • No Resolved Ambulatory Problems     Past Medical History:   Diagnosis Date   • A-fib (CMS/[facility])    • COPD (chronic obstructive pulmonary disease) (CMS/[facility])    • Diabetes mellitus (CMS/HCC)    • Emphysema lung (CMS/[facility])    • Herniated disc, cervical    • Hypertension    • Hypoxia [id number]   • Renal disorder          PAST SURGICAL HISTORY  History reviewed. No pertinent surgical history.      FAMILY HISTORY  History reviewed. No pertinent family history.      SOCIAL HISTORY  Social History     Socioeconomic History   • Marital status: Legally      Spouse name: Not on file   • Number of children: Not on file   • Years of education: Not on file   • Highest education level: Not on file    Tobacco Use   • Smoking status: Former Smoker   • Smokeless tobacco: Never Used   Substance and Sexual Activity   • Alcohol use: Not Currently     Comment: drank for 20 years, sober now    • Drug use: Never   • Sexual activity: Defer         ALLERGIES  Patient has no known allergies.        REVIEW OF SYSTEMS  Review of Systems   Review of all 14 systems is negative other than stated in the HPI above.      PHYSICAL EXAM  ED Triage Vitals [11/03/20 0741]   Temp Heart Rate Resp BP SpO2   (!) 101.2 °F (38.4 °C) 92 18 101/60 100 %      Temp src Heart Rate Source Patient Position BP Location FiO2 (%)   Tympanic Monitor Sitting Right arm --         GENERAL: Awake and alert, no acute distress, poor historian  HENT: nares patent  EYES: no scleral icterus, pupils 3 mm reactive bilaterally  CV: regular rhythm, mild tachycardia  RESPIRATORY: normal effort  ABDOMEN: soft, mild abdominal tenderness without rebound or guarding.  Diastases recti present, small umbilical hernia present that is easily reducible.  MUSCULOSKELETAL: no deformity  NEURO: alert, moves all extremities, follows commands, speech is slightly slurred.  Patient is a poor historian.  PSYCH:  calm, cooperative  SKIN: warm, dry    Vital signs and nursing notes reviewed.          LAB RESULTS  Recent Results (from the past 24 hour(s))   Urine Drug Screen - Urine, Clean Catch    Collection Time: 11/03/20  8:13 AM    Specimen: Urine, Clean Catch   Result Value Ref Range    Amphet/Methamphet, Screen Negative Negative    Barbiturates Screen, Urine Negative Negative    Benzodiazepine Screen, Urine Negative Negative    Cocaine Screen, Urine Negative Negative    Opiate Screen Negative Negative    THC, Screen, Urine Negative Negative    Methadone Screen, Urine Negative Negative    Oxycodone Screen, Urine Positive (A) Negative   Urinalysis With Culture If Indicated - Urine, Clean Catch    Collection Time: 11/03/20  8:13 AM    Specimen: Urine, Clean Catch   Result Value Ref  Range    Color, UA Yellow Yellow, Straw    Appearance, UA Cloudy (A) Clear    pH, UA <=5.0 5.0 - 8.0    Specific Gravity, UA 1.016 1.005 - 1.030    Glucose, UA Negative Negative    Ketones, UA Trace (A) Negative    Bilirubin, UA Negative Negative    Blood, UA Moderate (2+) (A) Negative    Protein, UA 30 mg/dL (1+) (A) Negative    Leuk Esterase, UA Negative Negative    Nitrite, UA Negative Negative    Urobilinogen, UA 0.2 E.U./dL 0.2 - 1.0 E.U./dL   Urinalysis, Microscopic Only - Urine, Clean Catch    Collection Time: 11/03/20  8:13 AM    Specimen: Urine, Clean Catch   Result Value Ref Range    RBC, UA 0-2 None Seen, 0-2 /HPF    WBC, UA 6-12 (A) None Seen, 0-2 /HPF    Bacteria, UA 1+ (A) None Seen /HPF    Squamous Epithelial Cells, UA 0-2 None Seen, 0-2 /HPF    Hyaline Casts, UA None Seen None Seen /LPF    Amorphous Crystals, UA Small/1+ None Seen /HPF    Methodology Manual Light Microscopy    ECG 12 Lead    Collection Time: 11/03/20  8:14 AM   Result Value Ref Range    QT Interval 292 ms   Respiratory Panel PCR w/COVID-19(SARS-CoV-2) ANH/EFRAIN/GEO/PAD/COR/MAD/PACHECO In-House, NP Swab in UTM/VTM, 3-4 HR TAT - Swab, Nasopharynx    Collection Time: 11/03/20  8:25 AM    Specimen: Nasopharynx; Swab   Result Value Ref Range    ADENOVIRUS, PCR Not Detected Not Detected    Coronavirus 229E Not Detected Not Detected    Coronavirus HKU1 Not Detected Not Detected    Coronavirus NL63 Not Detected Not Detected    Coronavirus OC43 Not Detected Not Detected    COVID19 Detected (C) Not Detected - Ref. Range    Human Metapneumovirus Not Detected Not Detected    Human Rhinovirus/Enterovirus Not Detected Not Detected    Influenza A PCR Not Detected Not Detected    Influenza A H1 Not Detected Not Detected    Influenza A H1 2009 PCR Not Detected Not Detected    Influenza A H3 Not Detected Not Detected    Influenza B PCR Not Detected Not Detected    Parainfluenza Virus 1 Not Detected Not Detected    Parainfluenza Virus 2 Not Detected Not  Detected    Parainfluenza Virus 3 Not Detected Not Detected    Parainfluenza Virus 4 Not Detected Not Detected    RSV, PCR Not Detected Not Detected    Bordetella pertussis pcr Not Detected Not Detected    Bordetella parapertussis PCR Not Detected Not Detected    Chlamydophila pneumoniae PCR Not Detected Not Detected    Mycoplasma pneumo by PCR Not Detected Not Detected   Comprehensive Metabolic Panel    Collection Time: 11/03/20  8:25 AM    Specimen: Blood   Result Value Ref Range    Glucose 102 (H) 65 - 99 mg/dL    BUN 48 (H) 8 - 23 mg/dL    Creatinine 4.03 (H) 0.76 - 1.27 mg/dL    Sodium 138 136 - 145 mmol/L    Potassium 3.6 3.5 - 5.2 mmol/L    Chloride 97 (L) 98 - 107 mmol/L    CO2 24.2 22.0 - 29.0 mmol/L    Calcium 9.0 8.6 - 10.5 mg/dL    Total Protein 7.6 6.0 - 8.5 g/dL    Albumin 4.80 3.50 - 5.20 g/dL    ALT (SGPT) 16 1 - 41 U/L    AST (SGOT) 37 1 - 40 U/L    Alkaline Phosphatase 83 39 - 117 U/L    Total Bilirubin 0.3 0.0 - 1.2 mg/dL    eGFR Non African Amer 15 (L) >60 mL/min/1.73    Globulin 2.8 gm/dL    A/G Ratio 1.7 g/dL    BUN/Creatinine Ratio 11.9 7.0 - 25.0    Anion Gap 16.8 (H) 5.0 - 15.0 mmol/L   BNP    Collection Time: 11/03/20  8:25 AM    Specimen: Blood   Result Value Ref Range    proBNP 2,922.0 (H) 0.0 - 900.0 pg/mL   Troponin    Collection Time: 11/03/20  8:25 AM    Specimen: Blood   Result Value Ref Range    Troponin T 0.369 (C) 0.000 - 0.030 ng/mL   Lactic Acid, Plasma    Collection Time: 11/03/20  8:25 AM    Specimen: Blood   Result Value Ref Range    Lactate 2.1 (C) 0.5 - 2.0 mmol/L   Procalcitonin    Collection Time: 11/03/20  8:25 AM    Specimen: Blood   Result Value Ref Range    Procalcitonin 1.65 (H) 0.00 - 0.25 ng/mL   Acetaminophen Level    Collection Time: 11/03/20  8:25 AM    Specimen: Blood   Result Value Ref Range    Acetaminophen <5.0 0.0 - 30.0 mcg/mL   Ethanol    Collection Time: 11/03/20  8:25 AM    Specimen: Blood   Result Value Ref Range    Ethanol <10 0 - 10 mg/dL    Ethanol  % <0.010 %   Salicylate Level    Collection Time: 11/03/20  8:25 AM    Specimen: Blood   Result Value Ref Range    Salicylate <0.3 <=30.0 mg/dL   CBC Auto Differential    Collection Time: 11/03/20  8:25 AM    Specimen: Blood   Result Value Ref Range    WBC 7.38 3.40 - 10.80 10*3/mm3    RBC 3.75 (L) 4.14 - 5.80 10*6/mm3    Hemoglobin 10.0 (L) 13.0 - 17.7 g/dL    Hematocrit 31.8 (L) 37.5 - 51.0 %    MCV 84.8 79.0 - 97.0 fL    MCH 26.7 26.6 - 33.0 pg    MCHC 31.4 (L) 31.5 - 35.7 g/dL    RDW 15.2 12.3 - 15.4 %    RDW-SD 47.2 37.0 - 54.0 fl    MPV 10.5 6.0 - 12.0 fL    Platelets 254 140 - 450 10*3/mm3    Neutrophil % 83.2 (H) 42.7 - 76.0 %    Lymphocyte % 10.0 (L) 19.6 - 45.3 %    Monocyte % 6.0 5.0 - 12.0 %    Eosinophil % 0.3 0.3 - 6.2 %    Basophil % 0.1 0.0 - 1.5 %    Immature Grans % 0.4 0.0 - 0.5 %    Neutrophils, Absolute 6.14 1.70 - 7.00 10*3/mm3    Lymphocytes, Absolute 0.74 0.70 - 3.10 10*3/mm3    Monocytes, Absolute 0.44 0.10 - 0.90 10*3/mm3    Eosinophils, Absolute 0.02 0.00 - 0.40 10*3/mm3    Basophils, Absolute 0.01 0.00 - 0.20 10*3/mm3    Immature Grans, Absolute 0.03 0.00 - 0.05 10*3/mm3    nRBC 0.0 0.0 - 0.2 /100 WBC   Lactic Acid, Reflex Timer (This will reflex a repeat order 3-3:15 hours after ordered.)    Collection Time: 11/03/20  8:25 AM    Specimen: Blood   Result Value Ref Range    Hold Tube Hold for add-ons.    CK    Collection Time: 11/03/20  8:25 AM    Specimen: Blood   Result Value Ref Range    Creatine Kinase 1,588 (H) 20 - 200 U/L   Troponin    Collection Time: 11/03/20  9:52 AM    Specimen: Blood   Result Value Ref Range    Troponin T 0.291 (C) 0.000 - 0.030 ng/mL       Ordered the above labs and reviewed the results.        RADIOLOGY  Ct Abdomen Pelvis Without Contrast    Result Date: 11/3/2020  CT ABDOMEN AND PELVIS WITHOUT CONTRAST  HISTORY: Fever, acute renal failure. To assess for calculus.  TECHNIQUE: Axial CT images of the abdomen and pelvis were obtained without administration of  intravenous contrast. The patient was not given oral contrast. Coronal and sagittal reformats were obtained.  COMPARISON: None  FINDINGS: Bilateral adrenal glands are normal. Both kidneys are normal in size and attenuation. Punctate 3 mm calculus is seen within the lower pole of the right kidney. No hydronephrosis. The right ureter demonstrates normal caliber. 2 punctate nonobstructing calculi, the larger measuring 4 mm also seen at the lower pole of the left kidney. No hydronephrosis. The left ureter demonstrates normal caliber. The urinary bladder is moderately distended and otherwise unremarkable. Mild diffuse hepatic steatosis is seen. The gallbladder is distended. The pancreas is normal without ductal dilatation. The spleen is normal. The small and large bowel loops demonstrate normal caliber. The appendix is normal. No pathological retroperitoneal lymphadenopathy. Moderate calcified atherosclerotic plaque is seen within the abdominal aorta and its branches. Small fat-containing umbilical hernia is present. Discoid atelectasis is seen within the right lung base. Degenerative disc disease is seen in the spine with vacuum disc phenomenon at multiple levels.      Bilateral nonobstructing punctate nephroliths.  Radiation dose reduction techniques were utilized, including automated exposure control and exposure modulation based on body size.      Xr Chest 1 View    Result Date: 11/3/2020  XR CHEST 1 VW-  Clinical: Fever and shortness of breath  COMPARISON: None  FINDINGS: Cardiac size upper limits of normal to mildly enlarged. No edema or effusion is demonstrated. Less than optimal inspiratory effort, minimal patchy infiltrate or atelectasis demonstrated at the right lung base. Favor atelectasis. No gross consolidation seen. There are monitoring leads superimposing the chest, the remainder is unremarkable.  This report was finalized on 11/3/2020 8:12 AM by Dr. Jonathan Wong M.D.        Ordered the above noted  radiological studies. Reviewed by me in PACS.            PROCEDURES  Critical Care  Performed by: Balaji Rodarte MD  Authorized by: Balaji Rodarte MD     Critical care provider statement:     Critical care time (minutes):  30    Critical care time was exclusive of:  Separately billable procedures and treating other patients    Critical care was necessary to treat or prevent imminent or life-threatening deterioration of the following conditions:  Sepsis and renal failure    Critical care was time spent personally by me on the following activities:  Development of treatment plan with patient or surrogate, discussions with consultants, evaluation of patient's response to treatment, obtaining history from patient or surrogate, ordering and review of laboratory studies, ordering and review of radiographic studies, ordering and performing treatments and interventions, pulse oximetry and re-evaluation of patient's condition                  MEDICATIONS GIVEN IN ER  Medications   sodium chloride 0.9 % flush 10 mL (has no administration in time range)   acetaminophen (TYLENOL) tablet 1,000 mg (1,000 mg Oral Given 11/3/20 0827)   cefTRIAXone (ROCEPHIN) IVPB 2 g (0 g Intravenous Stopped 11/3/20 1031)   lactated ringers bolus 2,553 mL (0 mL Intravenous Stopped 11/3/20 1218)                   MEDICAL DECISION MAKING, PROGRESS, and CONSULTS    All labs have been independently reviewed by me.  All radiology studies have been reviewed by me and discussed with radiologist dictating the report.   EKG's independently viewed and interpreted by me.  Discussion below represents my analysis of pertinent findings related to patient's condition, differential diagnosis, treatment plan and final disposition.    ED Course as of Nov 03 1432   Tue Nov 03, 2020   0901 Hemoglobin(!): 10.0 [JR]   0901 Lactate(!!): 2.1 [JR]   0901 Oxycodone Screen, Urine(!): Positive [JR]   0927 EKG          EKG time: 0814  Rhythm/Rate: Sinus tach,  136  P waves and MO: Normal  QRS, axis: Normal axis, low voltage  ST and T waves: Slight ST depression laterally    Interpreted Contemporaneously by me, independently viewed  Similar compared to prior 7/30/2017          [JR]   0928 Troponin T(!!): 0.369 [JR]   0928 Procalcitonin(!): 1.65 [JR]   0928 Lactate(!!): 2.1 [JR]   0928 BUN(!): 48 [JR]   0928 Creatinine(!): 4.03 [JR]   0928 Oxycodone Screen, Urine(!): Positive [JR]   0935 Work-up today remarkable for acute renal failure with creatinine 4.0, elevated BUN suggesting hypovolemia.  He is also febrile on arrival but with no leukocytosis, lactate 2.1.  Procalcitonin is elevated at 1.65.  He was given empiric Rocephin 2 g IV due to sepsis of unclear etiology.  Troponin is elevated at 0.369.  He was tachycardic on arrival with some slight lateral ST depressions, no ST elevation.  He denies chest pain currently.  I have ordered a repeat troponin to trend this however I suspect this is more likely secondary to sepsis and global hypoxia prior to arrival rather than type I NSTEMI.  I have ordered CT abdomen and urinalysis for further evaluation of fever and acute renal failure.    [JR]   1121 I discussed the CT abdomen with Dr. Kent, radiologist, who reports nonobstructing renal calculi, no hydronephrosis.    [JR]   1137 Discussed with Dr. Barnard, [facility], who agrees to admit.    [JR]   1137 Troponin T(!!): 0.291 [JR]   1229 It was discovered that patient has a nonparticipating insurance, however my professional opinion patient is too critical for elective transfer to a participating facility at this time.    [JR]      ED Course User Index  [JR] Balaji Rodarte MD     SEPTIC SHOCK FOCUSED EXAM ATTESTATION    I attest that I have reassessed tissue perfusion after the fluid bolus given.    Balaji Rodarte MD  11/03/20  14:32 EST           I wore a surgical mask, gloves, and eye protection during this patient encounter.  Patient also wearing a surgical mask.   Hand hygeine performed before and after seeing the patient.    DIAGNOSIS  Final diagnoses:   KRISTIE (acute kidney injury) (CMS/HCC)   Sepsis with acute renal failure without septic shock, due to unspecified organism, unspecified acute renal failure type (CMS/HCC)   COVID-19 virus infection   Elevated troponin         DISPOSITION  ADMIT            Latest Documented Vital Signs:  As of 14:32 EST  BP- 98/70 HR- 117 Temp- (!) 101 °F (38.3 °C) (Oral) O2 sat- 99%        --    Please note that portions of this were completed with a voice recognition program.          Balaji Rodarte MD  11/03/20 1138       Balaji Rodarte MD  11/03/20 8029

## 2020-11-03 NOTE — CONSULTS
[facility]                                                                                             Nephrology Initial Consult Note    Patient Identification:  Name: Mulugeta Falcon MRN: [id number]  Age: 65 y.o. : 1955  Sex: male  Date:11/3/2020    Requesting Physician: As per consult order.  Reason for Consultation: Acute kidney injury  Information from:patient/ family/ chart      History of Present Illness: This is a 65 y.o. year old male with no prior known history of chronic kidney disease, previous creatinine levels have been around 1.0.  Medical history significant for diabetes, COPD, hypertension who was admitted after found asleep in his car with altered mental status, noted to be hypoxemic with O2 sats in 70s and hypoglycemic when EMS arrived.  He was transported to the emergency department and discovered to be in renal failure with a creatinine of 4.  Feels better now with some supplemental oxygen complains of some dyspnea and cough, febrile to 101, mildly hypotensive on arrival.  He is making urine but it is dark-colored.  He states oral intake is been low since he is feels poorly.  Home medications include Metformin and Bumex, no NSAIDs, no recent IV contrast.  No nausea or vomiting diarrhea or constipation, no abdominal pain or dysuria.  No edema.    The following medical history and medications personally reviewed by me:    Problem List:   Patient Active Problem List    Diagnosis   • *KRISTIE (acute kidney injury) (CMS/[facility]) [N17.9]   • Rhabdomyolysis [M62.82]   • A-fib (CMS/[facility]) [I48.91]   • COPD (chronic obstructive pulmonary disease) (CMS/[facility]) [J44.9]   • Hypertension [I10]   • Diabetes mellitus (CMS/[facility]) [E11.9]   • Renal disorder [N28.9]   • Hypoxia [R09.02]   • UTI (urinary tract infection) [N39.0]   • Elevated troponin [R77.8]   • Pneumonia due to COVID-19 virus [U07.1, J12.89]   •  Acute respiratory failure with hypoxia (CMS/[facility]) [J96.01]       Past Medical History:  Past Medical History:   Diagnosis Date   • A-fib (CMS/[facility])    • COPD (chronic obstructive pulmonary disease) (CMS/[facility])    • Diabetes mellitus (CMS/[facility])     type 2   • Emphysema lung (CMS/[facility])    • Herniated disc, cervical    • Hypertension    • Hypoxia [id number]   • Renal disorder        Past Surgical History:  History reviewed. No pertinent surgical history.     Home Meds:   (Not in a hospital admission)      Current Meds:   Current Facility-Administered Medications   Medication Dose Route Frequency Provider Last Rate Last Dose   • acetaminophen (TYLENOL) tablet 650 mg  650 mg Oral Q4H PRN Malik Barnard MD        Or   • acetaminophen (TYLENOL) 160 MG/5ML solution 650 mg  650 mg Oral Q4H PRN Malik Barnard MD        Or   • acetaminophen (TYLENOL) suppository 650 mg  650 mg Rectal Q4H PRN Malik Barnard MD       • [START ON 11/4/2020] cefTRIAXone (ROCEPHIN) IVPB 2 g  2 g Intravenous Q24H Malik Barnard MD       • dexamethasone (DECADRON) tablet 6 mg  6 mg Oral Daily With Breakfast Malik Barnard MD       • heparin (porcine) 5000 UNIT/ML injection 5,000 Units  5,000 Units Subcutaneous Q8H Malik Barnard MD       • ondansetron (ZOFRAN) tablet 4 mg  4 mg Oral Q6H PRN Malik Barnard MD        Or   • ondansetron (ZOFRAN) injection 4 mg  4 mg Intravenous Q6H PRN Malik Barnard MD       • sodium chloride 0.9 % flush 10 mL  10 mL Intravenous PRN Balaji Rodarte MD       • sodium chloride 0.9 % flush 10 mL  10 mL Intravenous Q12H Malik Barnard MD       • sodium chloride 0.9 % flush 10 mL  10 mL Intravenous PRN Malik Barnard MD       • sodium chloride 0.9 % flush 10 mL  10 mL Intravenous Q12H Malik Barnard MD       • sodium chloride 0.9 % flush 10 mL  10 mL Intravenous PRN Malik Barnard MD       • sodium chloride 0.9 % infusion  100 mL/hr Intravenous Continuous Malik Barnard MD         Current Outpatient Medications   Medication  Sig Dispense Refill   • albuterol sulfate HFA (Ventolin HFA) 108 (90 Base) MCG/ACT inhaler VENTOLIN  (90 Base) MCG/ACT AERS     • budesonide-formoterol (Symbicort) 160-4.5 MCG/ACT inhaler SYMBICORT 160-4.5 MCG/ACT AERO     • bumetanide (BUMEX) 1 MG tablet Daily.     • clonazePAM (KlonoPIN) 1 MG tablet CLONAZEPAM 1 MG TABS     • cyclobenzaprine (FLEXERIL) 10 MG tablet CYCLOBENZAPRINE HCL 10 MG TABS     • dilTIAZem CD (Cartia XT) 240 MG 24 hr capsule CARTIA  MG XR24H-CAP     • gabapentin (NEURONTIN) 600 MG tablet Every 6 (Six) Hours.     • metFORMIN (GLUCOPHAGE) 1000 MG tablet METFORMIN HCL 1000 MG TABS     • methotrexate 2.5 MG tablet METHOTREXATE 2.5 MG TABS     • metoprolol succinate XL (TOPROL-XL) 50 MG 24 hr tablet Every 12 (Twelve) Hours.     • mometasone-formoterol (Dulera) 100-5 MCG/ACT inhaler DULERA 100-5 MCG/ACT AERO     • simvastatin (ZOCOR) 40 MG tablet SIMVASTATIN 40 MG TABS     • venlafaxine (EFFEXOR) 100 MG tablet VENLAFAXINE  MG TABS         Allergies:  No Known Allergies    Social History:   Social History     Socioeconomic History   • Marital status: Legally      Spouse name: Not on file   • Number of children: Not on file   • Years of education: Not on file   • Highest education level: Not on file   Tobacco Use   • Smoking status: Former Smoker   • Smokeless tobacco: Never Used   Substance and Sexual Activity   • Alcohol use: Not Currently     Comment: drank for 20 years, sober now    • Drug use: Never   • Sexual activity: Defer        Family History:  History reviewed. No pertinent family history.     Review of Systems: as per HPI, in addition:    General:      Complains of weakness / fatigue,                       + fevers / chills                       no weight loss  HEENT:       no dysphagia / odynophagia  Neck:           normal range of motion, no swelling  Respiratory: Complains of cough congestion and shortness of breath                      No wheezing  CV:      "         No chest pain                       No palpitations  Abdomen/GI: no nausea / vomiting                      No diarrhea / constipation                      No abdominal pain  :             no dysuria / urinary frequency                       No urgency, normal output  Endocrine:   no polyuria / polydipsia,                      No heat or cold intolerance  Skin:           no rashes or skin breakdown   Vascular:   No edema                     No claudication  Psych:        no depression/ anxiety  Neuro:        no focal weakness, no seizures  Musculoskeletal: no joint pain or deformities      Physical Exam:  Vitals:   Temp (24hrs), Av °F (38.3 °C), Min:100.9 °F (38.3 °C), Max:101.2 °F (38.4 °C)    /74   Pulse 118   Temp (!) 101 °F (38.3 °C) (Oral)   Resp 18   Ht 157.5 cm (62\")   Wt 85.1 kg (187 lb 11.2 oz)   SpO2 100%   BMI 34.33 kg/m²   Intake/Output:     Intake/Output Summary (Last 24 hours) at 11/3/2020 1646  Last data filed at 11/3/2020 1218  Gross per 24 hour   Intake 2603 ml   Output --   Net 2603 ml        Wt Readings from Last 1 Encounters:   20 0941 85.1 kg (187 lb 11.2 oz)       Exam:    General Appearance:  Awake, alert, oriented x3, no acute distress  Mildly ill-appearing   Head and Face:  Normocephalic, atraumatic, mucus membranes moist, oropharynx clear   Eyes:  No icterus, pupils equal round and reactive to light, extraocular movements intact    ENMT: Moist mucosa, tongue symmetric    Neck: Supple  no jugular venous distention  no thyromegaly   Pulmonary:  Respiratory effort: Normal  Auscultation of lungs: Clear bilaterally  No wheezes  No rhonchi, diminished, coarse breath sounds   Chest wall:  No tenderness or deformity   Cardiovascular:  Auscultation of the heart: Normal rhythm, no murmurs  No significant edema of bilateral lower extremities   Abdomen:  Abdomen: soft, non-tender, normal bowel sounds all four quadrants, no masses   Liver and spleen: no " hepatosplenomegaly   Musculoskeletal: Digits and nails: normal  Normal range of motion  No joint swelling or gross deformities    Skin: Skin inspection: color normal, no visible rashes or lesions  Skin palpation: texture, turgor normal, no palpable lesions   Lymphatic:  no cervical lymphadenopathy    Psychiatric: Judgement and insight: normal  Orientation to person place and time: normal  Mood and affect: normal       DATA:  Radiology and Labs:  The following labs independently reviewed by me, additional AM labs ordered  Old records independently reviewed showing CKD 3  The following radiologic studies independently viewed by me, findings CT abdomen pelvis shows bilateral nonobstructing stones, no obstruction noted  Interval notes, chart personally reviewed by me.  I have reviewed and summarized old records as detailed above  Plan of care discussed with patient at bedside and with Dr. Barnard  New problems include acute kidney injury      Risk/ complexity of medical care/ medical decision making: Risk with severe renal failure and COVID-19 pneumonia with acute respiratory failure  Chronic illness with severe exacerbation or progression      Labs:   Recent Results (from the past 24 hour(s))   Urine Drug Screen - Urine, Clean Catch    Collection Time: 11/03/20  8:13 AM    Specimen: Urine, Clean Catch   Result Value Ref Range    Amphet/Methamphet, Screen Negative Negative    Barbiturates Screen, Urine Negative Negative    Benzodiazepine Screen, Urine Negative Negative    Cocaine Screen, Urine Negative Negative    Opiate Screen Negative Negative    THC, Screen, Urine Negative Negative    Methadone Screen, Urine Negative Negative    Oxycodone Screen, Urine Positive (A) Negative   Urinalysis With Culture If Indicated - Urine, Clean Catch    Collection Time: 11/03/20  8:13 AM    Specimen: Urine, Clean Catch   Result Value Ref Range    Color, UA Yellow Yellow, Straw    Appearance, UA Cloudy (A) Clear    pH, UA <=5.0 5.0 - 8.0     Specific Gravity, UA 1.016 1.005 - 1.030    Glucose, UA Negative Negative    Ketones, UA Trace (A) Negative    Bilirubin, UA Negative Negative    Blood, UA Moderate (2+) (A) Negative    Protein, UA 30 mg/dL (1+) (A) Negative    Leuk Esterase, UA Negative Negative    Nitrite, UA Negative Negative    Urobilinogen, UA 0.2 E.U./dL 0.2 - 1.0 E.U./dL   Urinalysis, Microscopic Only - Urine, Clean Catch    Collection Time: 11/03/20  8:13 AM    Specimen: Urine, Clean Catch   Result Value Ref Range    RBC, UA 0-2 None Seen, 0-2 /HPF    WBC, UA 6-12 (A) None Seen, 0-2 /HPF    Bacteria, UA 1+ (A) None Seen /HPF    Squamous Epithelial Cells, UA 0-2 None Seen, 0-2 /HPF    Hyaline Casts, UA None Seen None Seen /LPF    Amorphous Crystals, UA Small/1+ None Seen /HPF    Methodology Manual Light Microscopy    ECG 12 Lead    Collection Time: 11/03/20  8:14 AM   Result Value Ref Range    QT Interval 292 ms   Respiratory Panel PCR w/COVID-19(SARS-CoV-2) ANH/EFRAIN/GEO/PAD/COR/MAD/PACHECO In-House, NP Swab in UTM/VTM, 3-4 HR TAT - Swab, Nasopharynx    Collection Time: 11/03/20  8:25 AM    Specimen: Nasopharynx; Swab   Result Value Ref Range    ADENOVIRUS, PCR Not Detected Not Detected    Coronavirus 229E Not Detected Not Detected    Coronavirus HKU1 Not Detected Not Detected    Coronavirus NL63 Not Detected Not Detected    Coronavirus OC43 Not Detected Not Detected    COVID19 Detected (C) Not Detected - Ref. Range    Human Metapneumovirus Not Detected Not Detected    Human Rhinovirus/Enterovirus Not Detected Not Detected    Influenza A PCR Not Detected Not Detected    Influenza A H1 Not Detected Not Detected    Influenza A H1 2009 PCR Not Detected Not Detected    Influenza A H3 Not Detected Not Detected    Influenza B PCR Not Detected Not Detected    Parainfluenza Virus 1 Not Detected Not Detected    Parainfluenza Virus 2 Not Detected Not Detected    Parainfluenza Virus 3 Not Detected Not Detected    Parainfluenza Virus 4 Not Detected Not  Detected    RSV, PCR Not Detected Not Detected    Bordetella pertussis pcr Not Detected Not Detected    Bordetella parapertussis PCR Not Detected Not Detected    Chlamydophila pneumoniae PCR Not Detected Not Detected    Mycoplasma pneumo by PCR Not Detected Not Detected   Comprehensive Metabolic Panel    Collection Time: 11/03/20  8:25 AM    Specimen: Blood   Result Value Ref Range    Glucose 102 (H) 65 - 99 mg/dL    BUN 48 (H) 8 - 23 mg/dL    Creatinine 4.03 (H) 0.76 - 1.27 mg/dL    Sodium 138 136 - 145 mmol/L    Potassium 3.6 3.5 - 5.2 mmol/L    Chloride 97 (L) 98 - 107 mmol/L    CO2 24.2 22.0 - 29.0 mmol/L    Calcium 9.0 8.6 - 10.5 mg/dL    Total Protein 7.6 6.0 - 8.5 g/dL    Albumin 4.80 3.50 - 5.20 g/dL    ALT (SGPT) 16 1 - 41 U/L    AST (SGOT) 37 1 - 40 U/L    Alkaline Phosphatase 83 39 - 117 U/L    Total Bilirubin 0.3 0.0 - 1.2 mg/dL    eGFR Non African Amer 15 (L) >60 mL/min/1.73    Globulin 2.8 gm/dL    A/G Ratio 1.7 g/dL    BUN/Creatinine Ratio 11.9 7.0 - 25.0    Anion Gap 16.8 (H) 5.0 - 15.0 mmol/L   BNP    Collection Time: 11/03/20  8:25 AM    Specimen: Blood   Result Value Ref Range    proBNP 2,922.0 (H) 0.0 - 900.0 pg/mL   Troponin    Collection Time: 11/03/20  8:25 AM    Specimen: Blood   Result Value Ref Range    Troponin T 0.369 (C) 0.000 - 0.030 ng/mL   Lactic Acid, Plasma    Collection Time: 11/03/20  8:25 AM    Specimen: Blood   Result Value Ref Range    Lactate 2.1 (C) 0.5 - 2.0 mmol/L   Procalcitonin    Collection Time: 11/03/20  8:25 AM    Specimen: Blood   Result Value Ref Range    Procalcitonin 1.65 (H) 0.00 - 0.25 ng/mL   Acetaminophen Level    Collection Time: 11/03/20  8:25 AM    Specimen: Blood   Result Value Ref Range    Acetaminophen <5.0 0.0 - 30.0 mcg/mL   Ethanol    Collection Time: 11/03/20  8:25 AM    Specimen: Blood   Result Value Ref Range    Ethanol <10 0 - 10 mg/dL    Ethanol % <0.010 %   Salicylate Level    Collection Time: 11/03/20  8:25 AM    Specimen: Blood   Result Value  Ref Range    Salicylate <0.3 <=30.0 mg/dL   CBC Auto Differential    Collection Time: 11/03/20  8:25 AM    Specimen: Blood   Result Value Ref Range    WBC 7.38 3.40 - 10.80 10*3/mm3    RBC 3.75 (L) 4.14 - 5.80 10*6/mm3    Hemoglobin 10.0 (L) 13.0 - 17.7 g/dL    Hematocrit 31.8 (L) 37.5 - 51.0 %    MCV 84.8 79.0 - 97.0 fL    MCH 26.7 26.6 - 33.0 pg    MCHC 31.4 (L) 31.5 - 35.7 g/dL    RDW 15.2 12.3 - 15.4 %    RDW-SD 47.2 37.0 - 54.0 fl    MPV 10.5 6.0 - 12.0 fL    Platelets 254 140 - 450 10*3/mm3    Neutrophil % 83.2 (H) 42.7 - 76.0 %    Lymphocyte % 10.0 (L) 19.6 - 45.3 %    Monocyte % 6.0 5.0 - 12.0 %    Eosinophil % 0.3 0.3 - 6.2 %    Basophil % 0.1 0.0 - 1.5 %    Immature Grans % 0.4 0.0 - 0.5 %    Neutrophils, Absolute 6.14 1.70 - 7.00 10*3/mm3    Lymphocytes, Absolute 0.74 0.70 - 3.10 10*3/mm3    Monocytes, Absolute 0.44 0.10 - 0.90 10*3/mm3    Eosinophils, Absolute 0.02 0.00 - 0.40 10*3/mm3    Basophils, Absolute 0.01 0.00 - 0.20 10*3/mm3    Immature Grans, Absolute 0.03 0.00 - 0.05 10*3/mm3    nRBC 0.0 0.0 - 0.2 /100 WBC   Lactic Acid, Reflex Timer (This will reflex a repeat order 3-3:15 hours after ordered.)    Collection Time: 11/03/20  8:25 AM    Specimen: Blood   Result Value Ref Range    Hold Tube Hold for add-ons.    CK    Collection Time: 11/03/20  8:25 AM    Specimen: Blood   Result Value Ref Range    Creatine Kinase 1,588 (H) 20 - 200 U/L   Troponin    Collection Time: 11/03/20  9:52 AM    Specimen: Blood   Result Value Ref Range    Troponin T 0.291 (C) 0.000 - 0.030 ng/mL   Ferritin    Collection Time: 11/03/20  9:52 AM    Specimen: Blood   Result Value Ref Range    Ferritin 54.30 30.00 - 400.00 ng/mL   Lactate Dehydrogenase    Collection Time: 11/03/20  9:52 AM    Specimen: Blood   Result Value Ref Range     (H) 135 - 225 U/L   Procalcitonin    Collection Time: 11/03/20  9:52 AM    Specimen: Blood   Result Value Ref Range    Procalcitonin 1.55 (H) 0.00 - 0.25 ng/mL   Lactic Acid, Reflex     Collection Time: 11/03/20  2:09 PM    Specimen: Blood   Result Value Ref Range    Lactate 1.2 0.5 - 2.0 mmol/L       Radiology:  Imaging Results (Last 24 Hours)     Procedure Component Value Units Date/Time    CT Abdomen Pelvis Without Contrast [[id number]] Collected: 11/03/20 1153     Updated: 11/03/20 1153    Narrative:      CT ABDOMEN AND PELVIS WITHOUT CONTRAST     HISTORY: Fever, acute renal failure. To assess for calculus.     TECHNIQUE: Axial CT images of the abdomen and pelvis were obtained  without administration of intravenous contrast. The patient was not  given oral contrast. Coronal and sagittal reformats were obtained.     COMPARISON: None     FINDINGS: Bilateral adrenal glands are normal. Both kidneys are normal  in size and attenuation. Punctate 3 mm calculus is seen within the lower  pole of the right kidney. No hydronephrosis. The right ureter  demonstrates normal caliber. 2 punctate nonobstructing calculi, the  larger measuring 4 mm also seen at the lower pole of the left kidney. No  hydronephrosis. The left ureter demonstrates normal caliber. The urinary  bladder is moderately distended and otherwise unremarkable. Mild diffuse  hepatic steatosis is seen. The gallbladder is distended. The pancreas is  normal without ductal dilatation. The spleen is normal. The small and  large bowel loops demonstrate normal caliber. The appendix is normal. No  pathological retroperitoneal lymphadenopathy. Moderate calcified  atherosclerotic plaque is seen within the abdominal aorta and its  branches. Small fat-containing umbilical hernia is present. Discoid  atelectasis is seen within the right lung base. Degenerative disc  disease is seen in the spine with vacuum disc phenomenon at multiple  levels.       Impression:      Bilateral nonobstructing punctate nephroliths.     Radiation dose reduction techniques were utilized, including automated  exposure control and exposure modulation based on body size.       XR  Chest 1 View [[id number]] Collected: 11/03/20 0811     Updated: 11/03/20 0815    Narrative:      XR CHEST 1 VW-     Clinical: Fever and shortness of breath     COMPARISON: None     FINDINGS: Cardiac size upper limits of normal to mildly enlarged. No  edema or effusion is demonstrated. Less than optimal inspiratory effort,  minimal patchy infiltrate or atelectasis demonstrated at the right lung  base. Favor atelectasis. No gross consolidation seen. There are  monitoring leads superimposing the chest, the remainder is unremarkable.     This report was finalized on 11/3/2020 8:12 AM by Dr. Jonathan Wong M.D.                  ASSESSMENT:   New, acute kidney injury, likely prerenal, hypoxemia and some mild hypotension likely contributing factors  CKD stage II, baseline creatinine around 1.0  Atrial fibrillation  Acute hypoxemic respiratory failure  COVID-19 pneumonia  Hypoxemia  Urinary tract infection  Anemia  Elevated troponin, likely in part due to his CKD  Elevated CK, rule out rhabdo  Lactic acidosis hypoxemia hypotension      PLAN:   Discussed plan of care with Dr. Barnard  Agree with gentle IV fluids  Hold diuretics and Metformin  IV antibiotics, renally dosed  CT shows no obstruction so no need for dedicated renal ultrasound at this time  We will check urine electrolytes, urine myoglobin  Recheck CK in a.m.    Continue to monitor electrolytes and volume closely, avoid IV contrast and nephrotoxic medications     I appreciate the consult request, please call if any questions      Wil Ladd M.D  [facility]  Office phone number: [phone]  Answering service phone number: [phone]        11/3/2020        Dictation via Dragon dictation software

## 2020-11-03 NOTE — ED NOTES
Pt in Pain Management, wife states they haven't been living together for x2 years, pt has been living with sister in [place], pt is from [place].   Pt is uncomplient with medications per wife.        Cathy Nguyễn, SHELL  11/03/20 1037

## 2020-11-03 NOTE — PROGRESS NOTES
Notified by  Valery O Van of patients non par insurance. Discussed with ER provider . Patient is altered and declared too unstable at this time for transfer. Rebekah Alcantar RN

## 2020-11-03 NOTE — ED NOTES
Pt presents to ED via EMS from car parking lot. EMS got a call for a confused man in a vehicle. Upon EMS arrival pt was in vehicle and would not open the door, and did not follow commands, with slurred speech. EMS was able to get patient out of vehicle, and patients oxygen was 76%, and blood glucose was 55. Pt was given oral glucose, and placed on 15L NRB. EMS was able to get a hold of spouse, who reports she has not seen him in a few days and has been staying with his sister, and states he has a hx of COPD, TIA, and is noncompliant with medication, and was not on oxygen when EMS arrived, but the tank was in the front seat. Pt is currently A&Ox2, did not ambulate with EMS, and in a mask.     Spouse: Haley [phone]  Sister: [phone]  EMS Reports vehicle is at [address] [facility].            Jason Harman RN  11/03/20 4893

## 2020-11-03 NOTE — CONSULTS
Referring Provider: Dr. Barnard  Reason for Consultation: COVID-19    Patient Care Team:  Provider, No Known as PCP - General    Chief complaint:   Altered mental status    History of present illness:    Subjective   This is a 65-year-old male patient with history of COPD who was noted to be altered in his car and EMS was called and found him to be hypoxic with SPO2 down in the 70% and blood sugar was down to 55.  He was brought to the ED and was screened for Covid with nasopharyngeal PCR which came back positive.    History is very limited.  Patient is poor historian and very distractible.  He also does not remember the events.      He reported being a former smoker, 40 packs year and uses oxygen 3 L/min.  He does not see a pulmonologist on a regular basis.  He takes Trelegy for COPD and rescue inhaler which he uses intermittently (some days does not require).    He denies current worsening shortness of breath, cough or fever or sick contact.      Labs reviewed: Pro-Esequiel 1.5; creatinine 4; anion gap 16; bicarb 24; hemoglobin 10; WBC normal; platelets 254        Review of Systems  Constitutional: No fever or chills.   ENMT: No sinus congestion or postnasal drip  Cardiovascular: No chest pain, palpitation or legs swelling.    Respiratory: Dyspnea on exertion at baseline.  No cough or wheezing.  Gastrointestinal: Abdominal discomfort but no nausea, vomiting or diarrhea.  Neurology: No headache, weakness, numbness or dizziness.   Musculoskeletal: No joints pain, stiffness or swelling.   Psychiatry: No depression.  Genitourinary: No dysuria or frequent urination  Endo: No weight changes. No cold or warm intolerance.  Lymphatic: No swollen glands.  Integumentary: No rash.    History  Past Medical History:   Diagnosis Date   • A-fib (CMS/[facility])    • COPD (chronic obstructive pulmonary disease) (CMS/[facility])    • Diabetes mellitus (CMS/HCC)     type 2   • Emphysema lung (CMS/HCC)    • Herniated disc, cervical    •  Hypertension    • Hypoxia [id number]   • Renal disorder    , History reviewed. No pertinent surgical history., History reviewed. No pertinent family history.,   Social History     Tobacco Use   • Smoking status: Former Smoker   • Smokeless tobacco: Never Used   Substance Use Topics   • Alcohol use: Not Currently     Comment: drank for 20 years, sober now    • Drug use: Never     E-cigarette/Vaping     E-cigarette/Vaping Substances     E-cigarette/Vaping Devices       ,   Medications Prior to Admission   Medication Sig Dispense Refill Last Dose   • albuterol sulfate HFA (Ventolin HFA) 108 (90 Base) MCG/ACT inhaler VENTOLIN  (90 Base) MCG/ACT AERS   Past Week at Unknown time   • budesonide-formoterol (Symbicort) 160-4.5 MCG/ACT inhaler SYMBICORT 160-4.5 MCG/ACT AERO   Past Week at Unknown time   • bumetanide (BUMEX) 1 MG tablet 2 mg Daily.   Past Week at Unknown time   • clonazePAM (KlonoPIN) 1 MG tablet CLONAZEPAM 1 MG TABS   Past Week at Unknown time   • cyclobenzaprine (FLEXERIL) 10 MG tablet CYCLOBENZAPRINE HCL 10 MG TABS   Past Week at Unknown time   • dilTIAZem CD (Cartia XT) 240 MG 24 hr capsule CARTIA  MG XR24H-CAP   Past Week at Unknown time   • gabapentin (NEURONTIN) 600 MG tablet Every 6 (Six) Hours.      • metFORMIN (GLUCOPHAGE) 1000 MG tablet METFORMIN HCL 1000 MG TABS      • methotrexate 2.5 MG tablet METHOTREXATE 2.5 MG TABS   Past Week at Unknown time   • metoprolol succinate XL (TOPROL-XL) 50 MG 24 hr tablet Every 12 (Twelve) Hours.      • mometasone-formoterol (Dulera) 100-5 MCG/ACT inhaler DULERA 100-5 MCG/ACT AERO      • simvastatin (ZOCOR) 40 MG tablet SIMVASTATIN 40 MG TABS      • venlafaxine (EFFEXOR) 100 MG tablet VENLAFAXINE  MG TABS   Past Week at Unknown time   , Scheduled Meds:  [START ON 11/4/2020] cefTRIAXone, 2 g, Intravenous, Q24H  dexamethasone, 6 mg, Oral, Daily With Breakfast  heparin (porcine), 5,000 Units, Subcutaneous, Q8H  sodium chloride, 10 mL, Intravenous,  Q12H  sodium chloride, 10 mL, Intravenous, Q12H     and Allergies:  Patient has no known allergies.    Objective     Vital Signs   Temp:  [100.9 °F (38.3 °C)-101.2 °F (38.4 °C)] 101 °F (38.3 °C)  Heart Rate:  [] 112  Resp:  [18] 18  BP: ()/(50-89) 106/89    Physical Exam:  Constitutional: Not in acute distress.  Eyes: Injected conjunctivae, EOMI. pupils equal reactive to light.  ENMT: Raymond 3. No oral thrush. Tonsils grade 1  Neck:  Trachea midline. No thyromegaly  Heart: RRR, no murmur  Lungs: Equal with diminished breath sounds bilaterally.  No crackles or wheezing.  Nonlabored breathing  Abdomen: Obese. Soft. No tenderness or dullness. No HSM.  Extremities: No cyanosis, clubbing or pitting edema.  Warm extremities and well-perfused.  Neuro: Conscious, alert, oriented x3.  Strength 5/5 in arms.  Psych: Appropriate mood and affect.    Integumentary: No rash.  Normal skin turgor  Lymphatic: No palpable cervical or supraclavicular lymph nodes.      Diagnostic imaging:  I personally and independently reviewed the following images:     CXR 11/3/2020: Increased vascular markings bilaterally.  Small patchy opacity RLL suggestive of atelectasis.      Lung portion of CT of the abdomen 11/3/2020: Right lower lobe opacity/bandlike atelectasis.        Assessment   1. COVID-19 infection  2. Right lower lobe atelectasis  3. COPD, no exacerbation  4. Chronic hypoxic respiratory failure, on oxygen 3 L/min at baseline  5. KRISTIE  6. Chronic anemia    Recommendations:  Dexamethasone 6 mg daily for 10 days.  He is currently on his usual 3 L oxygen supplementation and it is unclear how his baseline SPO2 as but I think he would benefit from dexamethasone in the setting of COPD history which appears to be severe.    Symbicort and Spiriva daily for COPD.  Albuterol HFA as needed    IV hydration    Initiate incentive spirometry    Titrate oxygen for SPO2 >92%    Thank you for the consultation.  We will follow  along    Discussed with his nurse    Ricardo Terrazas MD  11/03/20  17:54 EST

## 2020-11-03 NOTE — ED NOTES
"Pt called daughter unoriented to place states \"Im at [facility]\" daughter states sister is not able to get a hold of her.   Daughter Johanny Pentecost [phone]       Cathy Nguyễn RN  11/03/20 8403    "

## 2020-11-04 LAB
ABO GROUP BLD: NORMAL
ALBUMIN SERPL-MCNC: 3.8 G/DL (ref 3.5–5.2)
ALBUMIN/GLOB SERPL: 1.5 G/DL
ALP SERPL-CCNC: 68 U/L (ref 39–117)
ALT SERPL W P-5'-P-CCNC: 17 U/L (ref 1–41)
ANION GAP SERPL CALCULATED.3IONS-SCNC: 15.2 MMOL/L (ref 5–15)
AST SERPL-CCNC: 36 U/L (ref 1–40)
BACTERIA SPEC AEROBE CULT: NORMAL
BACTERIA UR QL AUTO: ABNORMAL /HPF
BASOPHILS # BLD AUTO: 0 10*3/MM3 (ref 0–0.2)
BASOPHILS NFR BLD AUTO: 0 % (ref 0–1.5)
BILIRUB SERPL-MCNC: 0.2 MG/DL (ref 0–1.2)
BILIRUB UR QL STRIP: NEGATIVE
BLD GP AB SCN SERPL QL: NEGATIVE
BUN SERPL-MCNC: 44 MG/DL (ref 8–23)
BUN/CREAT SERPL: 15.2 (ref 7–25)
CALCIUM SPEC-SCNC: 8.7 MG/DL (ref 8.6–10.5)
CHLORIDE SERPL-SCNC: 104 MMOL/L (ref 98–107)
CK SERPL-CCNC: 1268 U/L (ref 20–200)
CLARITY UR: CLEAR
CO2 SERPL-SCNC: 24.8 MMOL/L (ref 22–29)
COLOR UR: YELLOW
CREAT SERPL-MCNC: 2.89 MG/DL (ref 0.76–1.27)
CRP SERPL-MCNC: 11.99 MG/DL (ref 0–0.5)
D DIMER PPP FEU-MCNC: 0.72 MCGFEU/ML (ref 0–0.49)
DEPRECATED RDW RBC AUTO: 46.6 FL (ref 37–54)
EOSINOPHIL # BLD AUTO: 0.01 10*3/MM3 (ref 0–0.4)
EOSINOPHIL NFR BLD AUTO: 0.3 % (ref 0.3–6.2)
ERYTHROCYTE [DISTWIDTH] IN BLOOD BY AUTOMATED COUNT: 15.2 % (ref 12.3–15.4)
FERRITIN SERPL-MCNC: 77.2 NG/ML (ref 30–400)
FIBRINOGEN PPP-MCNC: 425 MG/DL (ref 219–464)
GFR SERPL CREATININE-BSD FRML MDRD: 22 ML/MIN/1.73
GLOBULIN UR ELPH-MCNC: 2.6 GM/DL
GLUCOSE BLDC GLUCOMTR-MCNC: 120 MG/DL (ref 70–130)
GLUCOSE BLDC GLUCOMTR-MCNC: 127 MG/DL (ref 70–130)
GLUCOSE BLDC GLUCOMTR-MCNC: 147 MG/DL (ref 70–130)
GLUCOSE BLDC GLUCOMTR-MCNC: 191 MG/DL (ref 70–130)
GLUCOSE SERPL-MCNC: 116 MG/DL (ref 65–99)
GLUCOSE UR STRIP-MCNC: NEGATIVE MG/DL
GRAN CASTS URNS QL MICRO: ABNORMAL /LPF
HCT VFR BLD AUTO: 27.7 % (ref 37.5–51)
HGB BLD-MCNC: 8.7 G/DL (ref 13–17.7)
HGB UR QL STRIP.AUTO: ABNORMAL
HYALINE CASTS UR QL AUTO: ABNORMAL /LPF
IMM GRANULOCYTES # BLD AUTO: 0.02 10*3/MM3 (ref 0–0.05)
IMM GRANULOCYTES NFR BLD AUTO: 0.5 % (ref 0–0.5)
KETONES UR QL STRIP: NEGATIVE
LDH SERPL-CCNC: 242 U/L (ref 135–225)
LEUKOCYTE ESTERASE UR QL STRIP.AUTO: NEGATIVE
LYMPHOCYTES # BLD AUTO: 0.66 10*3/MM3 (ref 0.7–3.1)
LYMPHOCYTES NFR BLD AUTO: 16.7 % (ref 19.6–45.3)
MCH RBC QN AUTO: 26.5 PG (ref 26.6–33)
MCHC RBC AUTO-ENTMCNC: 31.4 G/DL (ref 31.5–35.7)
MCV RBC AUTO: 84.5 FL (ref 79–97)
MONOCYTES # BLD AUTO: 0.19 10*3/MM3 (ref 0.1–0.9)
MONOCYTES NFR BLD AUTO: 4.8 % (ref 5–12)
NEUTROPHILS NFR BLD AUTO: 3.08 10*3/MM3 (ref 1.7–7)
NEUTROPHILS NFR BLD AUTO: 77.7 % (ref 42.7–76)
NITRITE UR QL STRIP: NEGATIVE
NRBC BLD AUTO-RTO: 0 /100 WBC (ref 0–0.2)
PH UR STRIP.AUTO: <=5 [PH] (ref 5–8)
PLATELET # BLD AUTO: 210 10*3/MM3 (ref 140–450)
PMV BLD AUTO: 11 FL (ref 6–12)
POTASSIUM SERPL-SCNC: 3.5 MMOL/L (ref 3.5–5.2)
PROT SERPL-MCNC: 6.4 G/DL (ref 6–8.5)
PROT UR QL STRIP: ABNORMAL
RBC # BLD AUTO: 3.28 10*6/MM3 (ref 4.14–5.8)
RBC # UR: ABNORMAL /HPF
REF LAB TEST METHOD: ABNORMAL
RH BLD: POSITIVE
SODIUM SERPL-SCNC: 144 MMOL/L (ref 136–145)
SP GR UR STRIP: 1.01 (ref 1–1.03)
SQUAMOUS #/AREA URNS HPF: ABNORMAL /HPF
T&S EXPIRATION DATE: NORMAL
TSH SERPL DL<=0.05 MIU/L-ACNC: 0.33 UIU/ML (ref 0.27–4.2)
UROBILINOGEN UR QL STRIP: ABNORMAL
WBC # BLD AUTO: 3.96 10*3/MM3 (ref 3.4–10.8)
WBC UR QL AUTO: ABNORMAL /HPF

## 2020-11-04 PROCEDURE — 99221 1ST HOSP IP/OBS SF/LOW 40: CPT | Performed by: INTERNAL MEDICINE

## 2020-11-04 PROCEDURE — 83615 LACTATE (LD) (LDH) ENZYME: CPT | Performed by: HOSPITALIST

## 2020-11-04 PROCEDURE — 84443 ASSAY THYROID STIM HORMONE: CPT | Performed by: HOSPITALIST

## 2020-11-04 PROCEDURE — 86140 C-REACTIVE PROTEIN: CPT | Performed by: HOSPITALIST

## 2020-11-04 PROCEDURE — 82728 ASSAY OF FERRITIN: CPT | Performed by: HOSPITALIST

## 2020-11-04 PROCEDURE — 80053 COMPREHEN METABOLIC PANEL: CPT | Performed by: HOSPITALIST

## 2020-11-04 PROCEDURE — 86900 BLOOD TYPING SEROLOGIC ABO: CPT | Performed by: HOSPITALIST

## 2020-11-04 PROCEDURE — 86850 RBC ANTIBODY SCREEN: CPT | Performed by: HOSPITALIST

## 2020-11-04 PROCEDURE — 81001 URINALYSIS AUTO W/SCOPE: CPT | Performed by: HOSPITALIST

## 2020-11-04 PROCEDURE — 86901 BLOOD TYPING SEROLOGIC RH(D): CPT | Performed by: HOSPITALIST

## 2020-11-04 PROCEDURE — 25010000003 CEFTRIAXONE PER 250 MG: Performed by: HOSPITALIST

## 2020-11-04 PROCEDURE — 85379 FIBRIN DEGRADATION QUANT: CPT | Performed by: HOSPITALIST

## 2020-11-04 PROCEDURE — 85025 COMPLETE CBC W/AUTO DIFF WBC: CPT | Performed by: HOSPITALIST

## 2020-11-04 PROCEDURE — 63710000001 DEXAMETHASONE PER 0.25 MG: Performed by: HOSPITALIST

## 2020-11-04 PROCEDURE — 94799 UNLISTED PULMONARY SVC/PX: CPT

## 2020-11-04 PROCEDURE — 63710000001 ONDANSETRON ODT 4 MG TABLET DISPERSIBLE: Performed by: NURSE PRACTITIONER

## 2020-11-04 PROCEDURE — 82962 GLUCOSE BLOOD TEST: CPT

## 2020-11-04 PROCEDURE — 25010000002 HEPARIN (PORCINE) PER 1000 UNITS: Performed by: HOSPITALIST

## 2020-11-04 PROCEDURE — 82550 ASSAY OF CK (CPK): CPT | Performed by: HOSPITALIST

## 2020-11-04 PROCEDURE — 63710000001 INSULIN LISPRO (HUMAN) PER 5 UNITS: Performed by: HOSPITALIST

## 2020-11-04 PROCEDURE — 99223 1ST HOSP IP/OBS HIGH 75: CPT | Performed by: INTERNAL MEDICINE

## 2020-11-04 PROCEDURE — 85384 FIBRINOGEN ACTIVITY: CPT | Performed by: HOSPITALIST

## 2020-11-04 RX ORDER — ONDANSETRON 4 MG/1
4 TABLET, FILM COATED ORAL EVERY 6 HOURS PRN
Status: DISCONTINUED | OUTPATIENT
Start: 2020-11-04 | End: 2020-11-06 | Stop reason: HOSPADM

## 2020-11-04 RX ORDER — GABAPENTIN 300 MG/1
600 CAPSULE ORAL EVERY 8 HOURS SCHEDULED
Status: DISCONTINUED | OUTPATIENT
Start: 2020-11-04 | End: 2020-11-06 | Stop reason: HOSPADM

## 2020-11-04 RX ORDER — ONDANSETRON 2 MG/ML
4 INJECTION INTRAMUSCULAR; INTRAVENOUS EVERY 6 HOURS PRN
Status: DISCONTINUED | OUTPATIENT
Start: 2020-11-04 | End: 2020-11-06 | Stop reason: HOSPADM

## 2020-11-04 RX ORDER — ONDANSETRON 4 MG/1
4 TABLET, ORALLY DISINTEGRATING ORAL ONCE
Status: COMPLETED | OUTPATIENT
Start: 2020-11-04 | End: 2020-11-04

## 2020-11-04 RX ADMIN — METOPROLOL SUCCINATE 50 MG: 50 TABLET, EXTENDED RELEASE ORAL at 00:19

## 2020-11-04 RX ADMIN — INSULIN LISPRO 2 UNITS: 100 INJECTION, SOLUTION INTRAVENOUS; SUBCUTANEOUS at 13:41

## 2020-11-04 RX ADMIN — ATORVASTATIN CALCIUM 20 MG: 20 TABLET, FILM COATED ORAL at 00:18

## 2020-11-04 RX ADMIN — TIOTROPIUM BROMIDE INHALATION SPRAY 2 PUFF: 3.12 SPRAY, METERED RESPIRATORY (INHALATION) at 08:03

## 2020-11-04 RX ADMIN — SODIUM CHLORIDE, PRESERVATIVE FREE 10 ML: 5 INJECTION INTRAVENOUS at 21:29

## 2020-11-04 RX ADMIN — VENLAFAXINE HYDROCHLORIDE 37.5 MG: 75 TABLET ORAL at 08:37

## 2020-11-04 RX ADMIN — DEXAMETHASONE 6 MG: 4 TABLET ORAL at 08:37

## 2020-11-04 RX ADMIN — VENLAFAXINE HYDROCHLORIDE 37.5 MG: 75 TABLET ORAL at 00:17

## 2020-11-04 RX ADMIN — DILTIAZEM HYDROCHLORIDE 240 MG: 240 CAPSULE, COATED, EXTENDED RELEASE ORAL at 00:19

## 2020-11-04 RX ADMIN — HEPARIN SODIUM 5000 UNITS: 5000 INJECTION INTRAVENOUS; SUBCUTANEOUS at 21:29

## 2020-11-04 RX ADMIN — BUDESONIDE AND FORMOTEROL FUMARATE DIHYDRATE 2 PUFF: 160; 4.5 AEROSOL RESPIRATORY (INHALATION) at 20:39

## 2020-11-04 RX ADMIN — GABAPENTIN 600 MG: 300 CAPSULE ORAL at 21:29

## 2020-11-04 RX ADMIN — ATORVASTATIN CALCIUM 20 MG: 20 TABLET, FILM COATED ORAL at 08:37

## 2020-11-04 RX ADMIN — HEPARIN SODIUM 5000 UNITS: 5000 INJECTION INTRAVENOUS; SUBCUTANEOUS at 18:23

## 2020-11-04 RX ADMIN — CEFTRIAXONE SODIUM 2 G: 2 INJECTION, SOLUTION INTRAVENOUS at 08:36

## 2020-11-04 RX ADMIN — SODIUM CHLORIDE, PRESERVATIVE FREE 10 ML: 5 INJECTION INTRAVENOUS at 08:36

## 2020-11-04 RX ADMIN — BUDESONIDE AND FORMOTEROL FUMARATE DIHYDRATE 2 PUFF: 160; 4.5 AEROSOL RESPIRATORY (INHALATION) at 08:03

## 2020-11-04 RX ADMIN — VENLAFAXINE HYDROCHLORIDE 37.5 MG: 75 TABLET ORAL at 18:23

## 2020-11-04 RX ADMIN — DEXAMETHASONE 6 MG: 4 TABLET ORAL at 00:18

## 2020-11-04 RX ADMIN — SODIUM CHLORIDE 100 ML/HR: 9 INJECTION, SOLUTION INTRAVENOUS at 08:37

## 2020-11-04 RX ADMIN — DILTIAZEM HYDROCHLORIDE 240 MG: 240 CAPSULE, COATED, EXTENDED RELEASE ORAL at 13:41

## 2020-11-04 RX ADMIN — METOPROLOL SUCCINATE 50 MG: 50 TABLET, EXTENDED RELEASE ORAL at 08:36

## 2020-11-04 RX ADMIN — ONDANSETRON 4 MG: 4 TABLET, ORALLY DISINTEGRATING ORAL at 18:23

## 2020-11-04 RX ADMIN — SODIUM CHLORIDE, PRESERVATIVE FREE 10 ML: 5 INJECTION INTRAVENOUS at 08:37

## 2020-11-04 RX ADMIN — HEPARIN SODIUM 5000 UNITS: 5000 INJECTION INTRAVENOUS; SUBCUTANEOUS at 06:14

## 2020-11-04 NOTE — PLAN OF CARE
Problem: Adult Inpatient Plan of Care  Goal: Plan of Care Review  11/4/2020 0458 by Will Limon RN  Outcome: Ongoing, Progressing  Flowsheets (Taken 11/4/2020 0453)  Outcome Summary: Pt was a new admt yesterday evening for KRISTIE and Covid 19. VSS. Pt has had a low grade fever over night but temp has been improving. C/o pain to back but declines pain medication. AAOx4. IVF's initiated. Pt has rested well over night. Nephrology, ID, and Cardiology to see pt today. Continuing to monitor.

## 2020-11-04 NOTE — CONSULTS
Referring Provider: Malik Barnard [place] [address]    Reason for Consultation: covid 19    History of present illness:  Mulugeta Falcon is a 65 y.o. with PMH COPD on 3L NC chronically who I am asked to evaluate and give opinion for covid 19. History is obtained from the patient and review of the old medical records which I summarize/synthesize as follows: He presented to the ER on 11/3/20 with confusion and slurred speech. No alleviating factors. No associated fever. He was apparently found in his car and had not been seen by his family in a few days. EMS found him to be hypoxic at 76% and hypoglycemic with BG 55.     In the ER he was febrile to 101.2 F with HR 92. Labs were notable for WBC, Procal 1.6, Crt 4, CK 1,588, and RPP + COVID-19. Troponin elevated to 0.3. He was started on empiric ceftriaxone. CT A/P without any obvious acute abnormalities. He has been seen by nephrology and pulmonary. Pulmonary started him on dexamethasone in the context of COPD. ID and cardiology also consulted.     At the time of my visit, he is afebrile and weaned down to 1 L via nasal cannula. He denies shortness of air. He denies dysuria and flank pain.    Past Medical History:   Diagnosis Date   • A-fib (CMS/HCC)    • COPD (chronic obstructive pulmonary disease) (CMS/HCC)    • Diabetes mellitus (CMS/HCC)     type 2   • Emphysema lung (CMS/HCC)    • Herniated disc, cervical    • Hypertension    • Hypoxia [id number]   • Renal disorder        History reviewed. No pertinent surgical history.    Social History:  Retired  Lives w/ his sister currently    Family History:  No 1st degree relatives w/ COVID-19    Antibiotic allergies and intolerances:  None    Medications:    Current Facility-Administered Medications:   •  acetaminophen (TYLENOL) tablet 650 mg, 650 mg, Oral, Q4H PRN **OR** acetaminophen (TYLENOL) 160 MG/5ML solution 650 mg, 650 mg, Oral, Q4H PRN **OR** acetaminophen (TYLENOL) suppository 650 mg, 650 mg,  Rectal, Q4H PRN, Malik Barnard MD  •  albuterol sulfate HFA (PROVENTIL HFA;VENTOLIN HFA;PROAIR HFA) inhaler 2 puff, 2 puff, Inhalation, Q6H PRN, Ricardo Terrazas MD  •  atorvastatin (LIPITOR) tablet 20 mg, 20 mg, Oral, Daily, Malik Barnard MD, 20 mg at 11/04/20 0018  •  budesonide-formoterol (SYMBICORT) 160-4.5 MCG/ACT inhaler 2 puff, 2 puff, Inhalation, BID - RT, Malik Barnard MD, 2 puff at 11/04/20 0803  •  cefTRIAXone (ROCEPHIN) IVPB 2 g, 2 g, Intravenous, Q24H, Malik Barnard MD  •  dexamethasone (DECADRON) tablet 6 mg, 6 mg, Oral, Daily With Breakfast, Malik Barnard MD, 6 mg at 11/04/20 0018  •  dextrose (D50W) 25 g/ 50mL Intravenous Solution 25 g, 25 g, Intravenous, Q15 Min PRN, Malik Barnard MD  •  dextrose (GLUTOSE) oral gel 15 g, 15 g, Oral, Q15 Min PRN, Malik Barnard MD  •  dilTIAZem CD (CARDIZEM CD) 24 hr capsule 240 mg, 240 mg, Oral, Q24H, Malik Barnard MD, 240 mg at 11/04/20 0019  •  glucagon (human recombinant) (GLUCAGEN DIAGNOSTIC) injection 1 mg, 1 mg, Subcutaneous, Q15 Min PRN, Malik Barnard MD  •  heparin (porcine) 5000 UNIT/ML injection 5,000 Units, 5,000 Units, Subcutaneous, Q8H, Malik Barnard MD, 5,000 Units at 11/04/20 0614  •  insulin lispro (humaLOG) injection 0-7 Units, 0-7 Units, Subcutaneous, TID AC, Malik Barnard MD  •  metoprolol succinate XL (TOPROL-XL) 24 hr tablet 50 mg, 50 mg, Oral, Q24H, Malik Barnard MD, 50 mg at 11/04/20 0019  •  ondansetron (ZOFRAN) tablet 4 mg, 4 mg, Oral, Q6H PRN **OR** ondansetron (ZOFRAN) injection 4 mg, 4 mg, Intravenous, Q6H PRN, Malik Barnard MD  •  [COMPLETED] Insert peripheral IV, , , Once **AND** sodium chloride 0.9 % flush 10 mL, 10 mL, Intravenous, PRN, Balaji Rodarte MD  •  sodium chloride 0.9 % flush 10 mL, 10 mL, Intravenous, Q12H, Malik Barnard MD, 10 mL at 11/03/20 2053  •  sodium chloride 0.9 % flush 10 mL, 10 mL, Intravenous, PRN, Malik Barnard MD  •  sodium chloride 0.9 % flush 10 mL, 10 mL, Intravenous, Q12H, Beard, Sutton  MD SINCERE, 10 mL at 11/03/20 2053  •  sodium chloride 0.9 % flush 10 mL, 10 mL, Intravenous, PRN, Malik Barnard MD  •  sodium chloride 0.9 % infusion, 100 mL/hr, Intravenous, Continuous, Malik Barnard MD, Last Rate: 100 mL/hr at 11/03/20 2209, 100 mL/hr at 11/03/20 2209  •  tiotropium (SPIRIVA RESPIMAT) 2.5 mcg/act aerosol solution inhaler, 2 puff, Inhalation, Daily - RT, Ricardo Terrazas MD, 2 puff at 11/04/20 0803  •  venlafaxine (EFFEXOR) tablet 37.5 mg, 37.5 mg, Oral, BID With Meals, Malik Barnard MD, 37.5 mg at 11/04/20 0017    Review of Systems  All systems were reviewed and are negative unless otherwise stated above in the HPI    Objective   Vital Signs   Temp:  [99.1 °F (37.3 °C)-101 °F (38.3 °C)] 99.1 °F (37.3 °C)  Heart Rate:  [] 95  Resp:  [14-20] 20  BP: ()/(50-89) 112/60    Physical Exam:   General: awake, alert, tangential speech  Head: atraumatic  Eyes: Pupils equal, no scleral icterus  ENT: poor dentition, NC in place  Neck: Supple  Cardiovascular: NR, RR, no murmurs, no LE edema  Respiratory: Lungs are clear to auscultation bilaterally, no rales or wheezing; normal work of breathing on 1L NC  GI: Abdomen is soft, non-tender, non-distended  :  no Vanessa catheter  Musculoskeletal:  normal musculature  Skin: No rashes, lesions, or embolic phenomenon  Neurological: Alert and oriented x 3  Psychiatric: odd affect, calm  Vasc: no cyanosis    Labs:     Lab Results   Component Value Date    WBC 7.38 11/03/2020    HGB 10.0 (L) 11/03/2020    HCT 31.8 (L) 11/03/2020    MCV 84.8 11/03/2020     11/03/2020       Lab Results   Component Value Date    GLUCOSE 102 (H) 11/03/2020    BUN 48 (H) 11/03/2020    CREATININE 4.03 (H) 11/03/2020    EGFRIFNONA 15 (L) 11/03/2020    BCR 11.9 11/03/2020    CO2 24.2 11/03/2020    CALCIUM 9.0 11/03/2020    ALBUMIN 4.80 11/03/2020    AST 37 11/03/2020    ALT 16 11/03/2020     Lab Results   Component Value Date    CKTOTAL 1,588 (H) 11/03/2020    TROPONINT 0.364 (C)  11/03/2020       Lab Results   Component Value Date    HGBA1C 4.74 (L) 11/03/2020     Lab Results   Component Value Date    CRP 7.78 (H) 08/01/2017     Lab Results   Component Value Date    FERRITIN 54.30 11/03/2020     UDS oxycodone  Trop 0.364  Procal 1.55  11/3 UA 6-12 WBCs, 1+ bactereia, negative LE and neg nitrite  11/4 UA 0 WBCs  BNP 2,922    Microbiology:  11/3 BCx: pending  11/3 RPP: + COVID-19  11/3 UCx: pending    Radiology (personally reviewed report):  CT A/P with B non-obstructing punctate nephroliths    CXR with minimal patchy infiltrate in right lung likely atelectasis    Assessment/Plan   1. COVID-19 infection  2. COPD due to history of tobacco use  3. Chronic hypoxic respiratory failure (3L at baseline)  4. Acute kidney injury superimposed upon CKD 2  5. Elevated CK  6. Controlled DM2  7. Elevated troponin    He is currently on less oxygen than his baseline. CXR not convincing for infiltrate. From a COVID-19 standpoint, I do not recommend dexamethasone or remdesivir at this time. I'll follow-up pulmonary evaluation today. Blood cultures thus far NGTD. He denies any urinary symptoms. I recommend we stop ceftriaxone, but I will follow-up his cultures and clinical course. I suspect procal (similar to the troponin) is affected by his acute kidney injury.    ID will follow. I discussed the case with his RN.

## 2020-11-04 NOTE — PROGRESS NOTES
LOS: 1 day     Chief Complaint/ Reason for encounter: CKD II  Chief Complaint   Patient presents with   • Altered Mental Status   • Shortness of Breath   • Hypoglycemia   • Fever         Subjective   Reported chronic swallowing issues   Vomiting upon exam  Otherwise he feels okay without any additional complaints      Medical history reviewed:  History of Present Illness    Subjective    History taken from: Patient and chart    Vital Signs  Temp:  [99.1 °F (37.3 °C)-100.3 °F (37.9 °C)] 99.1 °F (37.3 °C)  Heart Rate:  [] 95  Resp:  [14-20] 20  BP: ()/(50-89) 112/60       Wt Readings from Last 1 Encounters:   11/03/20 0941 85.1 kg (187 lb 11.2 oz)       Objective    Objective:  General Appearance:  Comfortable, mildly ill-appearing, in no acute distress and not in pain.  Awake, alert, oriented  HEENT: Mucous membranes moist, no injury, oropharynx clear  Lungs:  Normal effort and normal respiratory rate.  Breath sounds clear to auscultation.  No  respiratory distress.  No rales, decreased breath sounds or rhonchi.    Heart: Normal rate.  Regular rhythm.  S1 normal.  No murmur.   Abdomen: Abdomen is soft.  Bowel sounds are normal, no abdominal tenderness.  There is no rebound or guarding  Extremities: Normal range of motion.  No edema noted in bilateral lower extremities, distal pulses intact  Neurological: No focal motor or sensory deficits, pupils reactive  Skin:  Warm and dry.  No rash or cyanosis.       Results Review:    Intake/Output:     Intake/Output Summary (Last 24 hours) at 11/4/2020 1324  Last data filed at 11/4/2020 1100  Gross per 24 hour   Intake 440 ml   Output 300 ml   Net 140 ml         DATA:  Radiology and Labs:  The following labs independently reviewed by me. Additional labs ordered for tomorrow a.m.  Interval notes, chart personally reviewed by me.   Old records independently reviewed showing Cr baseline CKD III  The following radiologic studies independently viewed by me, findings  no obstruction noted  New problems include KRISTIE  Discussed with Dr Ladd     Risk/ complexity of medical care/ medical decision making moderate    Labs:   Recent Results (from the past 24 hour(s))   Lactic Acid, Reflex    Collection Time: 11/03/20  2:09 PM    Specimen: Blood   Result Value Ref Range    Lactate 1.2 0.5 - 2.0 mmol/L   Troponin    Collection Time: 11/03/20  6:07 PM    Specimen: Blood   Result Value Ref Range    Troponin T 0.364 (C) 0.000 - 0.030 ng/mL   D-dimer, Quantitative    Collection Time: 11/03/20  6:07 PM    Specimen: Blood   Result Value Ref Range    D-Dimer, Quantitative 0.75 (H) 0.00 - 0.49 MCGFEU/mL   POC Glucose Once    Collection Time: 11/03/20  8:30 PM    Specimen: Blood   Result Value Ref Range    Glucose 138 (H) 70 - 130 mg/dL   Urinalysis With Culture If Indicated - Urine, Clean Catch    Collection Time: 11/04/20 12:22 AM    Specimen: Urine, Clean Catch   Result Value Ref Range    Color, UA Yellow Yellow, Straw    Appearance, UA Clear Clear    pH, UA <=5.0 5.0 - 8.0    Specific Gravity, UA 1.010 1.005 - 1.030    Glucose, UA Negative Negative    Ketones, UA Negative Negative    Bilirubin, UA Negative Negative    Blood, UA Moderate (2+) (A) Negative    Protein, UA Trace (A) Negative    Leuk Esterase, UA Negative Negative    Nitrite, UA Negative Negative    Urobilinogen, UA 0.2 E.U./dL 0.2 - 1.0 E.U./dL   Urinalysis, Microscopic Only - Urine, Clean Catch    Collection Time: 11/04/20 12:22 AM    Specimen: Urine, Clean Catch   Result Value Ref Range    RBC, UA 0-2 None Seen, 0-2 /HPF    WBC, UA None Seen None Seen, 0-2 /HPF    Bacteria, UA Trace (A) None Seen /HPF    Squamous Epithelial Cells, UA None Seen None Seen, 0-2 /HPF    Hyaline Casts, UA None Seen None Seen /LPF    Granular Casts, UA 0-2 None Seen /LPF    Methodology Manual Light Microscopy    TSH    Collection Time: 11/04/20  7:32 AM    Specimen: Blood   Result Value Ref Range    TSH 0.330 0.270 - 4.200 uIU/mL   Comprehensive  Metabolic Panel    Collection Time: 11/04/20  7:32 AM    Specimen: Blood   Result Value Ref Range    Glucose 116 (H) 65 - 99 mg/dL    BUN 44 (H) 8 - 23 mg/dL    Creatinine 2.89 (H) 0.76 - 1.27 mg/dL    Sodium 144 136 - 145 mmol/L    Potassium 3.5 3.5 - 5.2 mmol/L    Chloride 104 98 - 107 mmol/L    CO2 24.8 22.0 - 29.0 mmol/L    Calcium 8.7 8.6 - 10.5 mg/dL    Total Protein 6.4 6.0 - 8.5 g/dL    Albumin 3.80 3.50 - 5.20 g/dL    ALT (SGPT) 17 1 - 41 U/L    AST (SGOT) 36 1 - 40 U/L    Alkaline Phosphatase 68 39 - 117 U/L    Total Bilirubin 0.2 0.0 - 1.2 mg/dL    eGFR Non African Amer 22 (L) >60 mL/min/1.73    Globulin 2.6 gm/dL    A/G Ratio 1.5 g/dL    BUN/Creatinine Ratio 15.2 7.0 - 25.0    Anion Gap 15.2 (H) 5.0 - 15.0 mmol/L   CK    Collection Time: 11/04/20  7:32 AM    Specimen: Blood   Result Value Ref Range    Creatine Kinase 1,268 (H) 20 - 200 U/L   C-reactive Protein    Collection Time: 11/04/20  7:32 AM    Specimen: Blood   Result Value Ref Range    C-Reactive Protein 11.99 (H) 0.00 - 0.50 mg/dL   Ferritin    Collection Time: 11/04/20  7:32 AM    Specimen: Blood   Result Value Ref Range    Ferritin 77.20 30.00 - 400.00 ng/mL   Lactate Dehydrogenase    Collection Time: 11/04/20  7:32 AM    Specimen: Blood   Result Value Ref Range     (H) 135 - 225 U/L   D-dimer, Quantitative    Collection Time: 11/04/20  7:32 AM    Specimen: Blood   Result Value Ref Range    D-Dimer, Quantitative 0.72 (H) 0.00 - 0.49 MCGFEU/mL   Fibrinogen    Collection Time: 11/04/20  7:32 AM    Specimen: Blood   Result Value Ref Range    Fibrinogen 425 219 - 464 mg/dL   Type & Screen    Collection Time: 11/04/20  7:32 AM    Specimen: Blood   Result Value Ref Range    ABO Type AB     RH type Positive     Antibody Screen Negative     T&S Expiration Date 11/7/2020 11:59:59 PM    CBC Auto Differential    Collection Time: 11/04/20  7:32 AM    Specimen: Blood   Result Value Ref Range    WBC 3.96 3.40 - 10.80 10*3/mm3    RBC 3.28 (L) 4.14 -  5.80 10*6/mm3    Hemoglobin 8.7 (L) 13.0 - 17.7 g/dL    Hematocrit 27.7 (L) 37.5 - 51.0 %    MCV 84.5 79.0 - 97.0 fL    MCH 26.5 (L) 26.6 - 33.0 pg    MCHC 31.4 (L) 31.5 - 35.7 g/dL    RDW 15.2 12.3 - 15.4 %    RDW-SD 46.6 37.0 - 54.0 fl    MPV 11.0 6.0 - 12.0 fL    Platelets 210 140 - 450 10*3/mm3    Neutrophil % 77.7 (H) 42.7 - 76.0 %    Lymphocyte % 16.7 (L) 19.6 - 45.3 %    Monocyte % 4.8 (L) 5.0 - 12.0 %    Eosinophil % 0.3 0.3 - 6.2 %    Basophil % 0.0 0.0 - 1.5 %    Immature Grans % 0.5 0.0 - 0.5 %    Neutrophils, Absolute 3.08 1.70 - 7.00 10*3/mm3    Lymphocytes, Absolute 0.66 (L) 0.70 - 3.10 10*3/mm3    Monocytes, Absolute 0.19 0.10 - 0.90 10*3/mm3    Eosinophils, Absolute 0.01 0.00 - 0.40 10*3/mm3    Basophils, Absolute 0.00 0.00 - 0.20 10*3/mm3    Immature Grans, Absolute 0.02 0.00 - 0.05 10*3/mm3    nRBC 0.0 0.0 - 0.2 /100 WBC   POC Glucose Once    Collection Time: 11/04/20  8:34 AM    Specimen: Blood   Result Value Ref Range    Glucose 120 70 - 130 mg/dL   POC Glucose Once    Collection Time: 11/04/20 11:21 AM    Specimen: Blood   Result Value Ref Range    Glucose 191 (H) 70 - 130 mg/dL       Radiology:  Imaging Results (Last 24 Hours)     Procedure Component Value Units Date/Time    CT Abdomen Pelvis Without Contrast [[id number]] Collected: 11/03/20 1153     Updated: 11/04/20 1027    Narrative:      CT ABDOMEN AND PELVIS WITHOUT CONTRAST     HISTORY: Fever, acute renal failure. To assess for calculus.     TECHNIQUE: Axial CT images of the abdomen and pelvis were obtained  without administration of intravenous contrast. The patient was not  given oral contrast. Coronal and sagittal reformats were obtained.     COMPARISON: None     FINDINGS: Bilateral adrenal glands are normal. Both kidneys are normal  in size and attenuation. Punctate 3 mm calculus is seen within the lower  pole of the right kidney. No hydronephrosis. The right ureter  demonstrates normal caliber. 2 punctate nonobstructing calculi,  the  larger measuring 4 mm also seen at the lower pole of the left kidney. No  hydronephrosis. The left ureter demonstrates normal caliber. The urinary  bladder is moderately distended and otherwise unremarkable. Mild diffuse  hepatic steatosis is seen. The gallbladder is distended. The pancreas is  normal without ductal dilatation. The spleen is normal. The small and  large bowel loops demonstrate normal caliber. The appendix is normal. No  pathological retroperitoneal lymphadenopathy. Moderate calcified  atherosclerotic plaque is seen within the abdominal aorta and its  branches. Small fat-containing umbilical hernia is present. Discoid  atelectasis is seen within the right lung base. Degenerative disc  disease is seen in the spine with vacuum disc phenomenon at multiple  levels.       Impression:      Bilateral nonobstructing punctate nephroliths.     Radiation dose reduction techniques were utilized, including automated  exposure control and exposure modulation based on body size.     This report was finalized on 11/4/2020 10:24 AM by Dr. Rafia Peacock M.D.                Medications have been reviewed:  Current Facility-Administered Medications   Medication Dose Route Frequency Provider Last Rate Last Dose   • acetaminophen (TYLENOL) tablet 650 mg  650 mg Oral Q4H PRN Malik Barnard MD        Or   • acetaminophen (TYLENOL) 160 MG/5ML solution 650 mg  650 mg Oral Q4H PRN Malik Barnard MD        Or   • acetaminophen (TYLENOL) suppository 650 mg  650 mg Rectal Q4H PRN Malik Barnard MD       • albuterol sulfate HFA (PROVENTIL HFA;VENTOLIN HFA;PROAIR HFA) inhaler 2 puff  2 puff Inhalation Q6H PRN Ricardo Terrazas MD       • atorvastatin (LIPITOR) tablet 20 mg  20 mg Oral Daily Malik Barnard MD   20 mg at 11/04/20 0837   • budesonide-formoterol (SYMBICORT) 160-4.5 MCG/ACT inhaler 2 puff  2 puff Inhalation BID - RT Malik Barnard MD   2 puff at 11/04/20 0803   • dexamethasone (DECADRON) tablet 6 mg  6 mg Oral  Daily With Breakfast Malik Barnard MD   6 mg at 11/04/20 0837   • dextrose (D50W) 25 g/ 50mL Intravenous Solution 25 g  25 g Intravenous Q15 Min PRN Malik Barnard MD       • dextrose (GLUTOSE) oral gel 15 g  15 g Oral Q15 Min PRN Malik Barnard MD       • dilTIAZem CD (CARDIZEM CD) 24 hr capsule 240 mg  240 mg Oral Q24H Malik Barnard MD   240 mg at 11/04/20 0019   • glucagon (human recombinant) (GLUCAGEN DIAGNOSTIC) injection 1 mg  1 mg Subcutaneous Q15 Min PRN Malik Barnard MD       • heparin (porcine) 5000 UNIT/ML injection 5,000 Units  5,000 Units Subcutaneous Q8H Malik Barnard MD   5,000 Units at 11/04/20 0614   • insulin lispro (humaLOG) injection 0-7 Units  0-7 Units Subcutaneous TID AC Malik Barnard MD       • metoprolol succinate XL (TOPROL-XL) 24 hr tablet 50 mg  50 mg Oral Q24H Malik Barnard MD   50 mg at 11/04/20 0836   • ondansetron (ZOFRAN) tablet 4 mg  4 mg Oral Q6H PRN Malik Barnard MD        Or   • ondansetron (ZOFRAN) injection 4 mg  4 mg Intravenous Q6H PRN Malik Barnard MD       • sodium chloride 0.9 % flush 10 mL  10 mL Intravenous PRN Balaji Rodarte MD       • sodium chloride 0.9 % flush 10 mL  10 mL Intravenous Q12H Malik Barnard MD   10 mL at 11/04/20 0837   • sodium chloride 0.9 % flush 10 mL  10 mL Intravenous PRN Malik Barnard MD       • sodium chloride 0.9 % flush 10 mL  10 mL Intravenous Q12H Malik Barnard MD   10 mL at 11/04/20 0836   • sodium chloride 0.9 % flush 10 mL  10 mL Intravenous PRN Malik Barnard MD       • sodium chloride 0.9 % infusion  100 mL/hr Intravenous Continuous Malik Barnard  mL/hr at 11/04/20 0837 100 mL/hr at 11/04/20 0837   • tiotropium (SPIRIVA RESPIMAT) 2.5 mcg/act aerosol solution inhaler  2 puff Inhalation Daily - RT Ricardo Terrazas MD   2 puff at 11/04/20 0803   • venlafaxine (EFFEXOR) tablet 37.5 mg  37.5 mg Oral BID With Meals Malik Barnard MD   37.5 mg at 11/04/20 0837       ASSESSMENT:  New, acute kidney injury, likely prerenal,  hypoxemia and some mild hypotension likely contributing factors  CKD stage II, baseline creatinine around 1.0  Atrial fibrillation  Acute hypoxemic respiratory failure  COVID-19 pneumonia  Hypoxemia  Urinary tract infection  Anemia  Elevated troponin, likely in part due to his CKD  Elevated CK, rule out rhabdo  Lactic acidosis hypoxemia hypotension    PLAN  Agree with gentle IV fluids  CK down significantly   Continue to hold diuretics and Metformin  IV antibiotics, renally dosed  CT shows no obstruction so no need for dedicated renal ultrasound at this time  Potassium replacement per protocol   May need swallow eval  Zofran for nausea    Recheck CK in a.m.    PAMELA Cook  [facility]   Office phone number: [phone]  Answering service phone number: [phone]    11/04/20  13:24 EST

## 2020-11-04 NOTE — NURSING NOTE
Pt has been very drowsy/letharic tonight and has been unable to take medications because he cannot stay awake. Pt arouses to voice and repeated stimulation but falls back asleep instantly. Pt unable to stay awake. Weaned down oxygen to 1L NC and satting 97%. VSS. Blood glucose stable. Continuing to monitor closely.

## 2020-11-04 NOTE — PROGRESS NOTES
Discharge Planning Assessment  [facility]     Patient Name: Mulugeta Falcon  MRN: [id number]  Today's Date: 11/4/2020    Admit Date: 11/3/2020    Discharge Needs Assessment     Row Name 11/04/20 1428       Living Environment    Lives With  sibling(s)    Name(s) of Who Lives With Patient  Zoe Corey, [phone]    Current Living Arrangements  home/apartment/condo    Primary Care Provided by  self    Provides Primary Care For  no one    Family Caregiver if Needed  sibling(s)    Quality of Family Relationships  helpful;involved;supportive    Able to Return to Prior Arrangements  yes       Resource/Environmental Concerns    Resource/Environmental Concerns  none       Transition Planning    Patient/Family Anticipates Transition to  home with family    Transportation Anticipated  family or friend will provide       Discharge Needs Assessment    Equipment Currently Used at Home  cane, straight;cpap;nebulizer;oxygen    Concerns to be Addressed  discharge planning    Discharge Coordination/Progress  Home with sister        Discharge Plan     Row Name 11/04/20 0212       Plan    Plan  Home with sister, follow for needs    Patient/Family in Agreement with Plan  yes    Plan Comments  IMM letter checked. CCP verified pt's information via [facility] room phone due to isolation precautions. Pt resides with his sister (Zoe Corey, [phone]) and brother in law (Derick Corey), and uses cane, nebulizer, cpap and O2 (cannot recall supply company). Pt denies past home health/sub-acute rehab. Pt uses [facility] [place] but agrees to meds to beds enrollment (udpated). Pt plans for his sister to transport him at d/c, and denies additional needs at this time. CCP to follow. Marilyn Dhaliwal LCSW        Continued Care and Services - Admitted Since 11/3/2020    Coordination has not been started for this encounter.         Demographic Summary     Row Name 11/04/20 1426       General Information    Admission Type   inpatient    Arrived From  home    Required Notices Provided  Important Message from Medicare    Referral Source  admission list    Reason for Consult  discharge planning    Preferred Language  English        Functional Status     Row Name 11/04/20 1426       Functional Status    Usual Activity Tolerance  good    Current Activity Tolerance  good       Functional Status, IADL    Medications  independent    Meal Preparation  independent    Housekeeping  independent    Laundry  independent    Shopping  independent       Mental Status Summary    Recent Changes in Mental Status/Cognitive Functioning  unable to assess        Psychosocial    No documentation.       Abuse/Neglect    No documentation.       Legal    No documentation.       Substance Abuse    No documentation.       Patient Forms    No documentation.           Modesta Dhaliwal LCSW

## 2020-11-04 NOTE — PROGRESS NOTES
"DAILY PROGRESS NOTE  [facility]    Patient Identification:  Name: Mulugeta Falcon  Age: 65 y.o.  Sex: male  :  1955  MRN: [id number]         Primary Care Physician: Provider, No Known    Subjective:  Interval History:He feels better and is more alert.    Objective:    Scheduled Meds:atorvastatin, 20 mg, Oral, Daily  budesonide-formoterol, 2 puff, Inhalation, BID - RT  dexamethasone, 6 mg, Oral, Daily With Breakfast  dilTIAZem CD, 240 mg, Oral, Q24H  heparin (porcine), 5,000 Units, Subcutaneous, Q8H  insulin lispro, 0-7 Units, Subcutaneous, TID AC  metoprolol succinate XL, 50 mg, Oral, Q24H  sodium chloride, 10 mL, Intravenous, Q12H  sodium chloride, 10 mL, Intravenous, Q12H  tiotropium bromide monohydrate, 2 puff, Inhalation, Daily - RT  venlafaxine, 37.5 mg, Oral, BID With Meals      Continuous Infusions:sodium chloride, 100 mL/hr, Last Rate: 100 mL/hr (20 0837)        Vital signs in last 24 hours:  Temp:  [99.1 °F (37.3 °C)-100.3 °F (37.9 °C)] 99.1 °F (37.3 °C)  Heart Rate:  [] 95  Resp:  [14-20] 20  BP: ()/(50-89) 112/60    Intake/Output:    Intake/Output Summary (Last 24 hours) at 2020 1216  Last data filed at 2020 0840  Gross per 24 hour   Intake 2793 ml   Output 300 ml   Net 2493 ml       Exam:  /60 (BP Location: Right arm, Patient Position: Lying)   Pulse 95   Temp 99.1 °F (37.3 °C) (Oral)   Resp 20   Ht 157.5 cm (62\")   Wt 85.1 kg (187 lb 11.2 oz)   SpO2 (!) 87%   BMI 34.33 kg/m²     General Appearance:    Alert, cooperative, no distress   Head:    Normocephalic, without obvious abnormality, atraumatic   Eyes:       Throat:   Lips, tongue, gums normal   Neck:   Supple, symmetrical, trachea midline, no JVD   Lungs:     Clear to auscultation bilaterally, respirations unlabored   Chest Wall:    No tenderness or deformity    Heart:    Regular rate and rhythm, S1 and S2 normal, no murmur,no  Rub or gallop   Abdomen:     Soft, nontender, bowel sounds " active, no masses, no organomegaly    Extremities:   Extremities normal, atraumatic, no cyanosis or edema   Pulses:      Skin:   Skin is warm and dry,  no rashes or palpable lesions   Neurologic:   no focal deficits noted      Lab Results (last 72 hours)     Procedure Component Value Units Date/Time    POC Glucose Once [[id number]]  (Abnormal) Collected: 11/04/20 1121    Specimen: Blood Updated: 11/04/20 1128     Glucose 191 mg/dL     Urine Culture - Urine, Urine, Clean Catch [[id number]] Collected: 11/03/20 0813    Specimen: Urine, Clean Catch Updated: 11/04/20 1019     Urine Culture 50,000 CFU/mL Mixed Jake Isolated    Narrative:      Specimen contains mixed organisms of questionable pathogenicity which indicates contamination with commensal jake.  Further identification is unlikely to provide clinically useful information.  Suggest recollection.    Comprehensive Metabolic Panel [[id number]]  (Abnormal) Collected: 11/04/20 0732    Specimen: Blood Updated: 11/04/20 1007     Glucose 116 mg/dL      BUN 44 mg/dL      Creatinine 2.89 mg/dL      Sodium 144 mmol/L      Potassium 3.5 mmol/L      Chloride 104 mmol/L      CO2 24.8 mmol/L      Calcium 8.7 mg/dL      Total Protein 6.4 g/dL      Albumin 3.80 g/dL      ALT (SGPT) 17 U/L      AST (SGOT) 36 U/L      Alkaline Phosphatase 68 U/L      Total Bilirubin 0.2 mg/dL      eGFR Non African Amer 22 mL/min/1.73      Globulin 2.6 gm/dL      A/G Ratio 1.5 g/dL      BUN/Creatinine Ratio 15.2     Anion Gap 15.2 mmol/L     Narrative:      GFR Normal >60  Chronic Kidney Disease <60  Kidney Failure <15      CK [[id number]]  (Abnormal) Collected: 11/04/20 0732    Specimen: Blood Updated: 11/04/20 1004     Creatine Kinase 1,268 U/L     C-reactive Protein [[id number]]  (Abnormal) Collected: 11/04/20 0732    Specimen: Blood Updated: 11/04/20 1004     C-Reactive Protein 11.99 mg/dL     Lactate Dehydrogenase [[id number]]  (Abnormal) Collected: 11/04/20 0732    Specimen: Blood Updated:  11/04/20 1004      U/L     TSH [[id number]]  (Normal) Collected: 11/04/20 0732    Specimen: Blood Updated: 11/04/20 0955     TSH 0.330 uIU/mL     Ferritin [[id number]]  (Normal) Collected: 11/04/20 0732    Specimen: Blood Updated: 11/04/20 0955     Ferritin 77.20 ng/mL     Narrative:      Results may be falsely decreased if patient taking Biotin.      D-dimer, Quantitative [[id number]]  (Abnormal) Collected: 11/04/20 0732    Specimen: Blood Updated: 11/04/20 0927     D-Dimer, Quantitative 0.72 MCGFEU/mL     Narrative:      The Stago D-Dimer test used in conjunction with a clinical pretest probability (PTP) assessment model, has been approved by the FDA to rule out the presence of venous thromboembolism (VTE) in outpatients suspected of deep venous thrombosis (DVT) or pulmonary embolism (PE). The cut-off for negative predictive value is <0.50 MCGFEU/mL.    Fibrinogen [[id number]]  (Normal) Collected: 11/04/20 0732    Specimen: Blood Updated: 11/04/20 0927     Fibrinogen 425 mg/dL     CBC & Differential [[id number]]  (Abnormal) Collected: 11/04/20 0732    Specimen: Blood Updated: 11/04/20 0914    Narrative:      The following orders were created for panel order CBC & Differential.  Procedure                               Abnormality         Status                     ---------                               -----------         ------                     CBC Auto Differential[[id number]]        Abnormal            Final result                 Please view results for these tests on the individual orders.    CBC Auto Differential [[id number]]  (Abnormal) Collected: 11/04/20 0732    Specimen: Blood Updated: 11/04/20 0914     WBC 3.96 10*3/mm3      RBC 3.28 10*6/mm3      Hemoglobin 8.7 g/dL      Hematocrit 27.7 %      MCV 84.5 fL      MCH 26.5 pg      MCHC 31.4 g/dL      RDW 15.2 %      RDW-SD 46.6 fl      MPV 11.0 fL      Platelets 210 10*3/mm3      Neutrophil % 77.7 %      Lymphocyte % 16.7 %      Monocyte % 4.8 %       Eosinophil % 0.3 %      Basophil % 0.0 %      Immature Grans % 0.5 %      Neutrophils, Absolute 3.08 10*3/mm3      Lymphocytes, Absolute 0.66 10*3/mm3      Monocytes, Absolute 0.19 10*3/mm3      Eosinophils, Absolute 0.01 10*3/mm3      Basophils, Absolute 0.00 10*3/mm3      Immature Grans, Absolute 0.02 10*3/mm3      nRBC 0.0 /100 WBC     Blood Culture - Blood, Arm, Left [[id number]] Collected: 11/03/20 0836    Specimen: Blood from Arm, Left Updated: 11/04/20 0845     Blood Culture No growth at 24 hours    Blood Culture - Blood, Arm, Left [[id number]] Collected: 11/03/20 0825    Specimen: Blood from Arm, Left Updated: 11/04/20 0845     Blood Culture No growth at 24 hours    POC Glucose Once [[id number]]  (Normal) Collected: 11/04/20 0834    Specimen: Blood Updated: 11/04/20 0836     Glucose 120 mg/dL     Urinalysis, Microscopic Only - Urine, Clean Catch [[id number]]  (Abnormal) Collected: 11/04/20 0022    Specimen: Urine, Clean Catch Updated: 11/04/20 0135     RBC, UA 0-2 /HPF      WBC, UA None Seen /HPF      Bacteria, UA Trace /HPF      Squamous Epithelial Cells, UA None Seen /HPF      Hyaline Casts, UA None Seen /LPF      Granular Casts, UA 0-2 /LPF      Methodology Manual Light Microscopy    Urinalysis With Culture If Indicated - Urine, Clean Catch [[id number]]  (Abnormal) Collected: 11/04/20 0022    Specimen: Urine, Clean Catch Updated: 11/04/20 0120     Color, UA Yellow     Appearance, UA Clear     pH, UA <=5.0     Specific Gravity, UA 1.010     Glucose, UA Negative     Ketones, UA Negative     Bilirubin, UA Negative     Blood, UA Moderate (2+)     Protein, UA Trace     Leuk Esterase, UA Negative     Nitrite, UA Negative     Urobilinogen, UA 0.2 E.U./dL    Hemoglobin A1c [[id number]]  (Abnormal) Collected: 11/03/20 0825    Specimen: Blood Updated: 11/03/20 2048     Hemoglobin A1C 4.74 %     Narrative:      Hemoglobin A1C Ranges:    Increased Risk for Diabetes  5.7% to 6.4%  Diabetes                     >=  6.5%  Diabetic Goal                < 7.0%    POC Glucose Once [[id number]]  (Abnormal) Collected: 11/03/20 2030    Specimen: Blood Updated: 11/03/20 2031     Glucose 138 mg/dL     Troponin [[id number]]  (Abnormal) Collected: 11/03/20 1807    Specimen: Blood Updated: 11/03/20 1852     Troponin T 0.364 ng/mL     Narrative:      Troponin T Reference Range:  <= 0.03 ng/mL-   Negative for AMI  >0.03 ng/mL-     Abnormal for myocardial necrosis.  Clinicians would have to utilize clinical acumen, EKG, Troponin and serial changes to determine if it is an Acute Myocardial Infarction or myocardial injury due to an underlying chronic condition.       Results may be falsely decreased if patient taking Biotin.      D-dimer, Quantitative [[id number]]  (Abnormal) Collected: 11/03/20 1807    Specimen: Blood Updated: 11/03/20 1833     D-Dimer, Quantitative 0.75 MCGFEU/mL     Narrative:      The Stago D-Dimer test used in conjunction with a clinical pretest probability (PTP) assessment model, has been approved by the FDA to rule out the presence of venous thromboembolism (VTE) in outpatients suspected of deep venous thrombosis (DVT) or pulmonary embolism (PE). The cut-off for negative predictive value is <0.50 MCGFEU/mL.    Procalcitonin [[id number]]  (Abnormal) Collected: 11/03/20 0952    Specimen: Blood Updated: 11/03/20 1643     Procalcitonin 1.55 ng/mL     Narrative:      As a Marker for Sepsis (Non-Neonates):   1. <0.5 ng/mL represents a low risk of severe sepsis and/or septic shock.  1. >2 ng/mL represents a high risk of severe sepsis and/or septic shock.    As a Marker for Lower Respiratory Tract Infections that require antibiotic therapy:  PCT on Admission     Antibiotic Therapy             6-12 Hrs later  > 0.5                Strongly Recommended            >0.25 - <0.5         Recommended  0.1 - 0.25           Discouraged                   Remeasure/reassess PCT  <0.1                 Strongly Discouraged           "Remeasure/reassess PCT      As 28 day mortality risk marker: \"Change in Procalcitonin Result\" (> 80 % or <=80 %) if Day 0 (or Day 1) and Day 4 values are available. Refer to http://[email or web address]-pct-calculator.com/   Change in PCT <=80 %   A decrease of PCT levels below or equal to 80 % defines a positive change in PCT test result representing a higher risk for 28-day all-cause mortality of patients diagnosed with severe sepsis or septic shock.  Change in PCT > 80 %   A decrease of PCT levels of more than 80 % defines a negative change in PCT result representing a lower risk for 28-day all-cause mortality of patients diagnosed with severe sepsis or septic shock.                Results may be falsely decreased if patient taking Biotin.     Ferritin [[id number]]  (Normal) Collected: 11/03/20 0952    Specimen: Blood Updated: 11/03/20 1640     Ferritin 54.30 ng/mL     Narrative:      Results may be falsely decreased if patient taking Biotin.      Lactate Dehydrogenase [[id number]]  (Abnormal) Collected: 11/03/20 0952    Specimen: Blood Updated: 11/03/20 1638      U/L      Comment: Specimen hemolyzed.  Results may be affected.       Lactic Acid, Reflex [[id number]]  (Normal) Collected: 11/03/20 1409    Specimen: Blood Updated: 11/03/20 1503     Lactate 1.2 mmol/L     Lactic Acid, Reflex Timer (This will reflex a repeat order 3-3:15 hours after ordered.) [[id number]] Collected: 11/03/20 0825    Specimen: Blood Updated: 11/03/20 1200     Hold Tube Hold for add-ons.     Comment: Auto resulted.       Troponin [[id number]]  (Abnormal) Collected: 11/03/20 0952    Specimen: Blood Updated: 11/03/20 1134     Troponin T 0.291 ng/mL     Narrative:      Troponin T Reference Range:  <= 0.03 ng/mL-   Negative for AMI  >0.03 ng/mL-     Abnormal for myocardial necrosis.  Clinicians would have to utilize clinical acumen, EKG, Troponin and serial changes to determine if it is an Acute Myocardial Infarction or myocardial injury due to an " underlying chronic condition.       Results may be falsely decreased if patient taking Biotin.      Urinalysis, Microscopic Only - Urine, Clean Catch [[id number]]  (Abnormal) Collected: 11/03/20 0813    Specimen: Urine, Clean Catch Updated: 11/03/20 1028     RBC, UA 0-2 /HPF      WBC, UA 6-12 /HPF      Bacteria, UA 1+ /HPF      Squamous Epithelial Cells, UA 0-2 /HPF      Hyaline Casts, UA None Seen /LPF      Amorphous Crystals, UA Small/1+ /HPF      Methodology Manual Light Microscopy    Respiratory Panel PCR w/COVID-19(SARS-CoV-2) ANH/EFRAIN/GEO/PAD/COR/MAD/PACHECO In-House, NP Swab in UTM/VTM, 3-4 HR TAT - Swab, Nasopharynx [[id number]]  (Abnormal) Collected: 11/03/20 0825    Specimen: Swab from Nasopharynx Updated: 11/03/20 1009     ADENOVIRUS, PCR Not Detected     Coronavirus 229E Not Detected     Coronavirus HKU1 Not Detected     Coronavirus NL63 Not Detected     Coronavirus OC43 Not Detected     COVID19 Detected     Human Metapneumovirus Not Detected     Human Rhinovirus/Enterovirus Not Detected     Influenza A PCR Not Detected     Influenza A H1 Not Detected     Influenza A H1 2009 PCR Not Detected     Influenza A H3 Not Detected     Influenza B PCR Not Detected     Parainfluenza Virus 1 Not Detected     Parainfluenza Virus 2 Not Detected     Parainfluenza Virus 3 Not Detected     Parainfluenza Virus 4 Not Detected     RSV, PCR Not Detected     Bordetella pertussis pcr Not Detected     Bordetella parapertussis PCR Not Detected     Chlamydophila pneumoniae PCR Not Detected     Mycoplasma pneumo by PCR Not Detected    Narrative:      Fact sheet for providers: https://docs.[employer]/wp-content/uploads/YUF0854-8530-EJ4.1-EUA-Provider-Fact-Sheet-3.pdf    Fact sheet for patients: https://docs.[employer]/wp-content/uploads/GVX6778-9513-ZE7.1-EUA-Patient-Fact-Sheet-1.pdf    Urinalysis With Culture If Indicated - Urine, Clean Catch [[id number]]  (Abnormal) Collected: 11/03/20 0813    Specimen: Urine, Clean Catch Updated:  "11/03/20 0959     Color, UA Yellow     Appearance, UA Cloudy     pH, UA <=5.0     Specific Gravity, UA 1.016     Glucose, UA Negative     Ketones, UA Trace     Bilirubin, UA Negative     Blood, UA Moderate (2+)     Protein, UA 30 mg/dL (1+)     Leuk Esterase, UA Negative     Nitrite, UA Negative     Urobilinogen, UA 0.2 E.U./dL    CK [[id number]]  (Abnormal) Collected: 11/03/20 0825    Specimen: Blood Updated: 11/03/20 0951     Creatine Kinase 1,588 U/L     Procalcitonin [[id number]]  (Abnormal) Collected: 11/03/20 0825    Specimen: Blood Updated: 11/03/20 0924     Procalcitonin 1.65 ng/mL     Narrative:      As a Marker for Sepsis (Non-Neonates):   1. <0.5 ng/mL represents a low risk of severe sepsis and/or septic shock.  1. >2 ng/mL represents a high risk of severe sepsis and/or septic shock.    As a Marker for Lower Respiratory Tract Infections that require antibiotic therapy:  PCT on Admission     Antibiotic Therapy             6-12 Hrs later  > 0.5                Strongly Recommended            >0.25 - <0.5         Recommended  0.1 - 0.25           Discouraged                   Remeasure/reassess PCT  <0.1                 Strongly Discouraged          Remeasure/reassess PCT      As 28 day mortality risk marker: \"Change in Procalcitonin Result\" (> 80 % or <=80 %) if Day 0 (or Day 1) and Day 4 values are available. Refer to http://www.[facility]-pct-calculator.com/   Change in PCT <=80 %   A decrease of PCT levels below or equal to 80 % defines a positive change in PCT test result representing a higher risk for 28-day all-cause mortality of patients diagnosed with severe sepsis or septic shock.  Change in PCT > 80 %   A decrease of PCT levels of more than 80 % defines a negative change in PCT result representing a lower risk for 28-day all-cause mortality of patients diagnosed with severe sepsis or septic shock.                Results may be falsely decreased if patient taking Biotin.     Troponin [[id number]]  " (Abnormal) Collected: 11/03/20 0825    Specimen: Blood Updated: 11/03/20 0921     Troponin T 0.369 ng/mL     Narrative:      Troponin T Reference Range:  <= 0.03 ng/mL-   Negative for AMI  >0.03 ng/mL-     Abnormal for myocardial necrosis.  Clinicians would have to utilize clinical acumen, EKG, Troponin and serial changes to determine if it is an Acute Myocardial Infarction or myocardial injury due to an underlying chronic condition.       Results may be falsely decreased if patient taking Biotin.      Comprehensive Metabolic Panel [[id number]]  (Abnormal) Collected: 11/03/20 0825    Specimen: Blood Updated: 11/03/20 0920     Glucose 102 mg/dL      BUN 48 mg/dL      Creatinine 4.03 mg/dL      Sodium 138 mmol/L      Potassium 3.6 mmol/L      Chloride 97 mmol/L      CO2 24.2 mmol/L      Calcium 9.0 mg/dL      Total Protein 7.6 g/dL      Albumin 4.80 g/dL      ALT (SGPT) 16 U/L      AST (SGOT) 37 U/L      Alkaline Phosphatase 83 U/L      Total Bilirubin 0.3 mg/dL      eGFR Non African Amer 15 mL/min/1.73      Globulin 2.8 gm/dL      A/G Ratio 1.7 g/dL      BUN/Creatinine Ratio 11.9     Anion Gap 16.8 mmol/L     Narrative:      GFR Normal >60  Chronic Kidney Disease <60  Kidney Failure <15      Acetaminophen Level [[id number]]  (Normal) Collected: 11/03/20 0825    Specimen: Blood Updated: 11/03/20 0920     Acetaminophen <5.0 mcg/mL     Ethanol [[id number]] Collected: 11/03/20 0825    Specimen: Blood Updated: 11/03/20 0920     Ethanol <10 mg/dL      Ethanol % <0.010 %     Salicylate Level [[id number]]  (Normal) Collected: 11/03/20 0825    Specimen: Blood Updated: 11/03/20 0920     Salicylate <0.3 mg/dL     Narrative:      Therapeutic range for Salicylates:  3.0 - 10.0 mg/dL for antipyretic/analgesic conditions  15.0 - 30.0 mg/dL for anti-inflammatory conditions    BNP [[id number]]  (Abnormal) Collected: 11/03/20 0825    Specimen: Blood Updated: 11/03/20 0917     proBNP 2,922.0 pg/mL     Narrative:      Among patients with  dyspnea, NT-proBNP is highly sensitive for the detection of acute congestive heart failure. In addition NT-proBNP of <300 pg/ml effectively rules out acute congestive heart failure with 99% negative predictive value.    Results may be falsely decreased if patient taking Biotin.      Lactic Acid, Plasma [[id number]]  (Abnormal) Collected: 11/03/20 0825    Specimen: Blood Updated: 11/03/20 0859     Lactate 2.1 mmol/L     Urine Drug Screen - Urine, Clean Catch [[id number]]  (Abnormal) Collected: 11/03/20 0813    Specimen: Urine, Clean Catch Updated: 11/03/20 0856     Amphet/Methamphet, Screen Negative     Barbiturates Screen, Urine Negative     Benzodiazepine Screen, Urine Negative     Cocaine Screen, Urine Negative     Opiate Screen Negative     THC, Screen, Urine Negative     Methadone Screen, Urine Negative     Oxycodone Screen, Urine Positive    Narrative:      Negative Thresholds For Drugs Screened:     Amphetamines               500 ng/ml   Barbiturates               200 ng/ml   Benzodiazepines            100 ng/ml   Cocaine                    300 ng/ml   Methadone                  300 ng/ml   Opiates                    300 ng/ml   Oxycodone                  100 ng/ml   THC                        50 ng/ml    The Normal Value for all drugs tested is negative. This report includes final unconfirmed screening results to be used for medical treatment purposes only. Unconfirmed results must not be used for non-medical purposes such as employment or legal testing. Clinical consideration should be applied to any drug of abuse test, particulary when unconfirmed results are used.    CBC & Differential [[id number]]  (Abnormal) Collected: 11/03/20 0825    Specimen: Blood Updated: 11/03/20 0851    Narrative:      The following orders were created for panel order CBC & Differential.  Procedure                               Abnormality         Status                     ---------                               -----------          ------                     CBC Auto Differential[[id number]]        Abnormal            Final result                 Please view results for these tests on the individual orders.    CBC Auto Differential [[id number]]  (Abnormal) Collected: 11/03/20 0825    Specimen: Blood Updated: 11/03/20 0851     WBC 7.38 10*3/mm3      RBC 3.75 10*6/mm3      Hemoglobin 10.0 g/dL      Hematocrit 31.8 %      MCV 84.8 fL      MCH 26.7 pg      MCHC 31.4 g/dL      RDW 15.2 %      RDW-SD 47.2 fl      MPV 10.5 fL      Platelets 254 10*3/mm3      Neutrophil % 83.2 %      Lymphocyte % 10.0 %      Monocyte % 6.0 %      Eosinophil % 0.3 %      Basophil % 0.1 %      Immature Grans % 0.4 %      Neutrophils, Absolute 6.14 10*3/mm3      Lymphocytes, Absolute 0.74 10*3/mm3      Monocytes, Absolute 0.44 10*3/mm3      Eosinophils, Absolute 0.02 10*3/mm3      Basophils, Absolute 0.01 10*3/mm3      Immature Grans, Absolute 0.03 10*3/mm3      nRBC 0.0 /100 WBC         Data Review:  Results from last 7 days   Lab Units 11/04/20  0732 11/03/20  0825   SODIUM mmol/L 144 138   POTASSIUM mmol/L 3.5 3.6   CHLORIDE mmol/L 104 97*   CO2 mmol/L 24.8 24.2   BUN mg/dL 44* 48*   CREATININE mg/dL 2.89* 4.03*   GLUCOSE mg/dL 116* 102*   CALCIUM mg/dL 8.7 9.0     Results from last 7 days   Lab Units 11/04/20  0732 11/03/20  0825   WBC 10*3/mm3 3.96 7.38   HEMOGLOBIN g/dL 8.7* 10.0*   HEMATOCRIT % 27.7* 31.8*   PLATELETS 10*3/mm3 210 254     Results from last 7 days   Lab Units 11/04/20  0732   TSH uIU/mL 0.330     Results from last 7 days   Lab Units 11/03/20  0825   HEMOGLOBIN A1C % 4.74*     Lab Results   Lab Value Date/Time    TROPONINT 0.364 (C) 11/03/2020 1807    TROPONINT 0.291 (C) 11/03/2020 0952    TROPONINT 0.369 (C) 11/03/2020 0825         Results from last 7 days   Lab Units 11/04/20  0732 11/03/20  0825   ALK PHOS U/L 68 83   BILIRUBIN mg/dL 0.2 0.3   ALT (SGPT) U/L 17 16   AST (SGOT) U/L 36 37     Results from last 7 days   Lab Units 11/04/20  0732   TSH  uIU/mL 0.330     Results from last 7 days   Lab Units 11/03/20  0825   HEMOGLOBIN A1C % 4.74*     Glucose   Date/Time Value Ref Range Status   11/04/2020 1121 191 (H) 70 - 130 mg/dL Final   11/04/2020 0834 120 70 - 130 mg/dL Final   11/03/2020 2030 138 (H) 70 - 130 mg/dL Final           Past Medical History:   Diagnosis Date   • A-fib (CMS/[facility])    • COPD (chronic obstructive pulmonary disease) (CMS/[facility])    • Diabetes mellitus (CMS/[facility])     type 2   • Emphysema lung (CMS/[facility])    • Herniated disc, cervical    • Hypertension    • Hypoxia [id number]   • Renal disorder        Assessment:  Active Hospital Problems    Diagnosis  POA   • **KRISTIE (acute kidney injury) (CMS/[facility]) [N17.9]  Yes   • Rhabdomyolysis [M62.82]  Unknown   • A-fib (CMS/[facility]) [I48.91]  Unknown   • COPD (chronic obstructive pulmonary disease) (CMS/[facility]) [J44.9]  Unknown   • Hypertension [I10]  Unknown   • Diabetes mellitus (CMS/[facility]) [E11.9]  Unknown   • Renal disorder [N28.9]  Unknown   • Hypoxia [R09.02]  Unknown   • UTI (urinary tract infection) [N39.0]  Unknown   • Elevated troponin [R77.8]  Unknown   • Pneumonia due to COVID-19 virus [U07.1, J12.89]  Unknown   • Acute respiratory failure with hypoxia (CMS/[facility]) [J96.01]  Unknown      Resolved Hospital Problems   No resolved problems to display.       Plan:  ID,pulmonary, renal and cardiology consults noted.  Continue with some IV fluid hydration and will restart some of his other home medicines.  Will get follow-up lab studies.    Malik Barnard MD  11/4/2020  12:16 EST

## 2020-11-04 NOTE — CONSULTS
Patient Name: Mulugeta Falcon  :1955  65 y.o.    Date of Admission: 11/3/2020  Encounter Provider: Melonie Lo MD  Date of Encounter Visit: 20  Place of Service: [facility]  Referring Provider: Malik Barnard MD  Patient Care Team:  Provider, No Known as PCP - General      Chief complaint: AMS      History of Present Illness:    This is a patient who generally follows with [facility] in [place]. There is a history of atrial fibrillation, COPD, and type 2 diabetes. No recent cardiac studies. Reportedly there was an echo in 2017 showing ejection fraction that was normal with mild mitral regurgitation. He presented to the hospital with altered mental status. He was found in his car and was confused. There was apparently hypoxia noted on initial assessment. Low-grade fever. COVID positive. I have been asked to see for elevated troponin.         Past Medical History:   Diagnosis Date   • A-fib (CMS/[facility])    • COPD (chronic obstructive pulmonary disease) (CMS/[facility])    • Diabetes mellitus (CMS/[facility])     type 2   • Emphysema lung (CMS/[facility])    • Herniated disc, cervical    • Hypertension    • Hypoxia [id number]   • Renal disorder        History reviewed. No pertinent surgical history.      Prior to Admission medications    Medication Sig Start Date End Date Taking? Authorizing Provider   albuterol sulfate HFA (Ventolin HFA) 108 (90 Base) MCG/ACT inhaler VENTOLIN  (90 Base) MCG/ACT AERS 18  Yes ProviderMary MD   budesonide-formoterol (Symbicort) 160-4.5 MCG/ACT inhaler SYMBICORT 160-4.5 MCG/ACT AERO 1/3/17  Yes Provider, MD Mary   bumetanide (BUMEX) 1 MG tablet 2 mg Daily. 17  Yes ProviderMary MD   clonazePAM (KlonoPIN) 1 MG tablet CLONAZEPAM 1 MG TABS 1/3/17  Yes ProviderMary MD   cyclobenzaprine (FLEXERIL) 10 MG tablet CYCLOBENZAPRINE HCL 10 MG TABS 1/3/17  Yes Provider, MD Mary   dilTIAZem CD (Cartia XT) 240 MG  24 hr capsule CARTIA  MG [id number] 1/3/17  Yes ProvideraMry MD   gabapentin (NEURONTIN) 600 MG tablet Every 6 (Six) Hours. 1/3/17  Yes ProviderMary MD   metFORMIN (GLUCOPHAGE) 1000 MG tablet METFORMIN HCL 1000 MG TABS 1/3/17  Yes Provider, MD Mary   methotrexate 2.5 MG tablet METHOTREXATE 2.5 MG TABS 1/3/17  Yes ProviderMary MD   metoprolol succinate XL (TOPROL-XL) 50 MG 24 hr tablet Every 12 (Twelve) Hours. 1/6/18  Yes ProviderMary MD   mometasone-formoterol (Dulera) 100-5 MCG/ACT inhaler DULERA 100-5 MCG/ACT AERO 9/12/18  Yes ProviderMary MD   simvastatin (ZOCOR) 40 MG tablet SIMVASTATIN 40 MG TABS 1/3/17  Yes ProviderMary MD   venlafaxine (EFFEXOR) 100 MG tablet VENLAFAXINE  MG TABS 1/3/17  Yes ProviderMary MD       No Known Allergies    Social History     Socioeconomic History   • Marital status: Legally      Spouse name: Not on file   • Number of children: Not on file   • Years of education: Not on file   • Highest education level: Not on file   Tobacco Use   • Smoking status: Former Smoker   • Smokeless tobacco: Never Used   Substance and Sexual Activity   • Alcohol use: Not Currently     Comment: drank for 20 years, sober now    • Drug use: Never   • Sexual activity: Defer       History reviewed. No pertinent family history.    REVIEW OF SYSTEMS:   All other systems reviewed and negative.        Objective:     Vitals:    11/04/20 0012 11/04/20 0738 11/04/20 0803 11/04/20 0811   BP: 115/81 112/60     BP Location: Right arm Right arm     Patient Position: Lying Lying     Pulse: 115 86 93 95   Resp: 16 16 20    Temp: 99.6 °F (37.6 °C) 99.1 °F (37.3 °C)     TempSrc: Oral Oral     SpO2: 100% 91% 91% (!) 87%   Weight:       Height:         Body mass index is 34.33 kg/m².    Intake/Output Summary (Last 24 hours) at 11/4/2020 1126  Last data filed at 11/4/2020 0840  Gross per 24 hour   Intake 2793 ml   Output 300 ml   Net 2493  ml       Exam deferred due to covid positive status    Lab Review:     Results from last 7 days   Lab Units 11/04/20  0732   SODIUM mmol/L 144   POTASSIUM mmol/L 3.5   CHLORIDE mmol/L 104   CO2 mmol/L 24.8   BUN mg/dL 44*   CREATININE mg/dL 2.89*   CALCIUM mg/dL 8.7   BILIRUBIN mg/dL 0.2   ALK PHOS U/L 68   ALT (SGPT) U/L 17   AST (SGOT) U/L 36   GLUCOSE mg/dL 116*     Results from last 7 days   Lab Units 11/04/20  0732 11/03/20  1807 11/03/20  0952 11/03/20  0825   CK TOTAL U/L 1,268*  --   --  1,588*   TROPONIN T ng/mL  --  0.364* 0.291* 0.369*     Results from last 7 days   Lab Units 11/04/20  0732   WBC 10*3/mm3 3.96   HEMOGLOBIN g/dL 8.7*   HEMATOCRIT % 27.7*   PLATELETS 10*3/mm3 210                                I personally viewed and interpreted the patient's EKG/Telemetry data.        Assessment and Plan:       Type II myocardial infarction.  EKG is abnormal with poor R wave progression.  Last stress test in 2017.  Not appropriate for current cardiac testings due to Covid positive status.  Repeat an echocardiogram in 6 weeks.  Lexiscan nuclear stress test at the same time.  In the meantime, try to improve oxygenation.  Treat anemia.  Elevated creatinine.  This may be contributing to the elevation in troponin.    Melonie Lo MD  11/04/20  11:26 EST

## 2020-11-05 LAB
ALBUMIN SERPL-MCNC: 3.8 G/DL (ref 3.5–5.2)
ALBUMIN/GLOB SERPL: 1.8 G/DL
ALP SERPL-CCNC: 60 U/L (ref 39–117)
ALT SERPL W P-5'-P-CCNC: 18 U/L (ref 1–41)
ANION GAP SERPL CALCULATED.3IONS-SCNC: 10.2 MMOL/L (ref 5–15)
AST SERPL-CCNC: 29 U/L (ref 1–40)
BASOPHILS # BLD AUTO: 0 10*3/MM3 (ref 0–0.2)
BASOPHILS NFR BLD AUTO: 0 % (ref 0–1.5)
BILIRUB SERPL-MCNC: <0.2 MG/DL (ref 0–1.2)
BUN SERPL-MCNC: 36 MG/DL (ref 8–23)
BUN/CREAT SERPL: 16.8 (ref 7–25)
CALCIUM SPEC-SCNC: 8.3 MG/DL (ref 8.6–10.5)
CHLORIDE SERPL-SCNC: 107 MMOL/L (ref 98–107)
CK SERPL-CCNC: 707 U/L (ref 20–200)
CO2 SERPL-SCNC: 25.8 MMOL/L (ref 22–29)
CREAT SERPL-MCNC: 2.14 MG/DL (ref 0.76–1.27)
CRP SERPL-MCNC: 6.26 MG/DL (ref 0–0.5)
DEPRECATED RDW RBC AUTO: 47.7 FL (ref 37–54)
EOSINOPHIL # BLD AUTO: 0 10*3/MM3 (ref 0–0.4)
EOSINOPHIL NFR BLD AUTO: 0 % (ref 0.3–6.2)
ERYTHROCYTE [DISTWIDTH] IN BLOOD BY AUTOMATED COUNT: 15.3 % (ref 12.3–15.4)
FERRITIN SERPL-MCNC: 77.3 NG/ML (ref 30–400)
GFR SERPL CREATININE-BSD FRML MDRD: 31 ML/MIN/1.73
GLOBULIN UR ELPH-MCNC: 2.1 GM/DL
GLUCOSE BLDC GLUCOMTR-MCNC: 102 MG/DL (ref 70–130)
GLUCOSE BLDC GLUCOMTR-MCNC: 103 MG/DL (ref 70–130)
GLUCOSE BLDC GLUCOMTR-MCNC: 112 MG/DL (ref 70–130)
GLUCOSE BLDC GLUCOMTR-MCNC: 138 MG/DL (ref 70–130)
GLUCOSE SERPL-MCNC: 99 MG/DL (ref 65–99)
HCT VFR BLD AUTO: 24.4 % (ref 37.5–51)
HGB BLD-MCNC: 7.9 G/DL (ref 13–17.7)
IMM GRANULOCYTES # BLD AUTO: 0.02 10*3/MM3 (ref 0–0.05)
IMM GRANULOCYTES NFR BLD AUTO: 0.4 % (ref 0–0.5)
LYMPHOCYTES # BLD AUTO: 1.08 10*3/MM3 (ref 0.7–3.1)
LYMPHOCYTES NFR BLD AUTO: 23.5 % (ref 19.6–45.3)
MCH RBC QN AUTO: 27.4 PG (ref 26.6–33)
MCHC RBC AUTO-ENTMCNC: 32.4 G/DL (ref 31.5–35.7)
MCV RBC AUTO: 84.7 FL (ref 79–97)
MONOCYTES # BLD AUTO: 0.45 10*3/MM3 (ref 0.1–0.9)
MONOCYTES NFR BLD AUTO: 9.8 % (ref 5–12)
NEUTROPHILS NFR BLD AUTO: 3.05 10*3/MM3 (ref 1.7–7)
NEUTROPHILS NFR BLD AUTO: 66.3 % (ref 42.7–76)
NRBC BLD AUTO-RTO: 0 /100 WBC (ref 0–0.2)
PLATELET # BLD AUTO: 203 10*3/MM3 (ref 140–450)
PMV BLD AUTO: 11.2 FL (ref 6–12)
POTASSIUM SERPL-SCNC: 4 MMOL/L (ref 3.5–5.2)
PROT SERPL-MCNC: 5.9 G/DL (ref 6–8.5)
RBC # BLD AUTO: 2.88 10*6/MM3 (ref 4.14–5.8)
SODIUM SERPL-SCNC: 143 MMOL/L (ref 136–145)
WBC # BLD AUTO: 4.6 10*3/MM3 (ref 3.4–10.8)

## 2020-11-05 PROCEDURE — 94799 UNLISTED PULMONARY SVC/PX: CPT

## 2020-11-05 PROCEDURE — 25010000002 HEPARIN (PORCINE) PER 1000 UNITS: Performed by: HOSPITALIST

## 2020-11-05 PROCEDURE — 80053 COMPREHEN METABOLIC PANEL: CPT | Performed by: HOSPITALIST

## 2020-11-05 PROCEDURE — 82550 ASSAY OF CK (CPK): CPT | Performed by: HOSPITALIST

## 2020-11-05 PROCEDURE — 99232 SBSQ HOSP IP/OBS MODERATE 35: CPT | Performed by: INTERNAL MEDICINE

## 2020-11-05 PROCEDURE — 82728 ASSAY OF FERRITIN: CPT | Performed by: HOSPITALIST

## 2020-11-05 PROCEDURE — 82962 GLUCOSE BLOOD TEST: CPT

## 2020-11-05 PROCEDURE — 86140 C-REACTIVE PROTEIN: CPT | Performed by: HOSPITALIST

## 2020-11-05 PROCEDURE — 36415 COLL VENOUS BLD VENIPUNCTURE: CPT | Performed by: HOSPITALIST

## 2020-11-05 PROCEDURE — 85025 COMPLETE CBC W/AUTO DIFF WBC: CPT | Performed by: HOSPITALIST

## 2020-11-05 RX ADMIN — ATORVASTATIN CALCIUM 20 MG: 20 TABLET, FILM COATED ORAL at 08:09

## 2020-11-05 RX ADMIN — VENLAFAXINE HYDROCHLORIDE 37.5 MG: 75 TABLET ORAL at 18:13

## 2020-11-05 RX ADMIN — METOPROLOL SUCCINATE 50 MG: 50 TABLET, EXTENDED RELEASE ORAL at 08:09

## 2020-11-05 RX ADMIN — SODIUM CHLORIDE, PRESERVATIVE FREE 10 ML: 5 INJECTION INTRAVENOUS at 21:13

## 2020-11-05 RX ADMIN — BUDESONIDE AND FORMOTEROL FUMARATE DIHYDRATE 2 PUFF: 160; 4.5 AEROSOL RESPIRATORY (INHALATION) at 20:22

## 2020-11-05 RX ADMIN — SODIUM CHLORIDE, PRESERVATIVE FREE 10 ML: 5 INJECTION INTRAVENOUS at 21:06

## 2020-11-05 RX ADMIN — SODIUM CHLORIDE, PRESERVATIVE FREE 10 ML: 5 INJECTION INTRAVENOUS at 08:10

## 2020-11-05 RX ADMIN — HEPARIN SODIUM 5000 UNITS: 5000 INJECTION INTRAVENOUS; SUBCUTANEOUS at 21:07

## 2020-11-05 RX ADMIN — SODIUM CHLORIDE 100 ML/HR: 9 INJECTION, SOLUTION INTRAVENOUS at 15:11

## 2020-11-05 RX ADMIN — TIOTROPIUM BROMIDE INHALATION SPRAY 2 PUFF: 3.12 SPRAY, METERED RESPIRATORY (INHALATION) at 11:03

## 2020-11-05 RX ADMIN — GABAPENTIN 600 MG: 300 CAPSULE ORAL at 06:21

## 2020-11-05 RX ADMIN — DILTIAZEM HYDROCHLORIDE 240 MG: 240 CAPSULE, COATED, EXTENDED RELEASE ORAL at 08:09

## 2020-11-05 RX ADMIN — GABAPENTIN 600 MG: 300 CAPSULE ORAL at 15:12

## 2020-11-05 RX ADMIN — SODIUM CHLORIDE, PRESERVATIVE FREE 10 ML: 5 INJECTION INTRAVENOUS at 08:09

## 2020-11-05 RX ADMIN — HEPARIN SODIUM 5000 UNITS: 5000 INJECTION INTRAVENOUS; SUBCUTANEOUS at 06:22

## 2020-11-05 RX ADMIN — SODIUM CHLORIDE 100 ML/HR: 9 INJECTION, SOLUTION INTRAVENOUS at 04:51

## 2020-11-05 RX ADMIN — VENLAFAXINE HYDROCHLORIDE 37.5 MG: 75 TABLET ORAL at 08:09

## 2020-11-05 RX ADMIN — BUDESONIDE AND FORMOTEROL FUMARATE DIHYDRATE 2 PUFF: 160; 4.5 AEROSOL RESPIRATORY (INHALATION) at 11:03

## 2020-11-05 RX ADMIN — HEPARIN SODIUM 5000 UNITS: 5000 INJECTION INTRAVENOUS; SUBCUTANEOUS at 15:12

## 2020-11-05 RX ADMIN — GABAPENTIN 600 MG: 300 CAPSULE ORAL at 21:06

## 2020-11-05 NOTE — PROGRESS NOTES
No change from recommendations yesterday.  I have placed orders for him to get an echo and a nuclear stress test in 6 weeks.  My office has been notified and will contact him with appointment times.

## 2020-11-05 NOTE — SIGNIFICANT NOTE
11/05/20 0920   OTHER   Discipline speech language pathologist   Rehab Time/Intention   Session Not Performed other (see comments)  (SLP consult for swallow eval due to altered mental status on admission. Symptoms resolved, diet ordered and RN reports tolerating well. Speech to sign off, please reconsult as needed.)

## 2020-11-05 NOTE — PLAN OF CARE
Goal Outcome Evaluation:  Plan of Care Reviewed With: patient  Progress: improving       A&OX4. Vss and on RA. Unsteady gait. Ambulating with stand by assist and cane. Denies pain. No distress noted.

## 2020-11-05 NOTE — PROGRESS NOTES
LOS: 2 days     Chief Complaint:  Follow-up + COVID    Interval History:  No fever. On room air. Says he feels back to his baseline and is eager for discharge.     ROS: no CP; no rash    Vital Signs  Temp:  [97.1 °F (36.2 °C)-98.4 °F (36.9 °C)] 97.1 °F (36.2 °C)  Heart Rate:  [60-95] 60  Resp:  [16-20] 18  BP: (106-124)/(60-79) 109/69    Physical Exam:  General: awake, alert, less confused today  Eyes: no scleral icterus  ENT: poor dentition  Cardiovascular: NR  Respiratory: no rales or wheezing on RA  GI: Abdomen is soft, non-tender, non-distended  :  no Vanessa catheter  Skin: No rashes  Neurological: Alert and oriented x 3  Vasc: no cyanosis    Meds:    Current Facility-Administered Medications:   •  acetaminophen (TYLENOL) tablet 650 mg, 650 mg, Oral, Q4H PRN **OR** acetaminophen (TYLENOL) 160 MG/5ML solution 650 mg, 650 mg, Oral, Q4H PRN **OR** acetaminophen (TYLENOL) suppository 650 mg, 650 mg, Rectal, Q4H PRN, Malik Barnard MD  •  albuterol sulfate HFA (PROVENTIL HFA;VENTOLIN HFA;PROAIR HFA) inhaler 2 puff, 2 puff, Inhalation, Q6H PRN, Ricardo Terrazas MD  •  atorvastatin (LIPITOR) tablet 20 mg, 20 mg, Oral, Daily, Malik Barnard MD, 20 mg at 11/05/20 0809  •  budesonide-formoterol (SYMBICORT) 160-4.5 MCG/ACT inhaler 2 puff, 2 puff, Inhalation, BID - RT, Malik Barnard MD, 2 puff at 11/04/20 2039  •  dextrose (D50W) 25 g/ 50mL Intravenous Solution 25 g, 25 g, Intravenous, Q15 Min PRN, Malik Barnard MD  •  dextrose (GLUTOSE) oral gel 15 g, 15 g, Oral, Q15 Min PRN, Malik Barnard MD  •  dilTIAZem CD (CARDIZEM CD) 24 hr capsule 240 mg, 240 mg, Oral, Q24H, Malik Barnard MD, 240 mg at 11/05/20 0809  •  gabapentin (NEURONTIN) capsule 600 mg, 600 mg, Oral, Q8H, Malik Barnard MD, 600 mg at 11/05/20 0621  •  glucagon (human recombinant) (GLUCAGEN DIAGNOSTIC) injection 1 mg, 1 mg, Subcutaneous, Q15 Min PRN, Malik Barnard MD  •  heparin (porcine) 5000 UNIT/ML injection 5,000 Units, 5,000 Units, Subcutaneous, Q8H,  Malik Barnard MD, 5,000 Units at 11/05/20 0622  •  insulin lispro (humaLOG) injection 0-7 Units, 0-7 Units, Subcutaneous, TID AC, Malik Barnard MD, 2 Units at 11/04/20 1341  •  metoprolol succinate XL (TOPROL-XL) 24 hr tablet 50 mg, 50 mg, Oral, Q24H, Malik Barnard MD, 50 mg at 11/05/20 0809  •  ondansetron (ZOFRAN) tablet 4 mg, 4 mg, Oral, Q6H PRN **OR** ondansetron (ZOFRAN) injection 4 mg, 4 mg, Intravenous, Q6H PRN, Andrea Barrett APRN  •  [COMPLETED] Insert peripheral IV, , , Once **AND** sodium chloride 0.9 % flush 10 mL, 10 mL, Intravenous, PRN, Balaji Rodarte MD  •  sodium chloride 0.9 % flush 10 mL, 10 mL, Intravenous, Q12H, Malik Barnard MD, 10 mL at 11/05/20 0810  •  sodium chloride 0.9 % flush 10 mL, 10 mL, Intravenous, PRN, Malik Barnard MD  •  sodium chloride 0.9 % flush 10 mL, 10 mL, Intravenous, Q12H, Malik Barnard MD, 10 mL at 11/05/20 0809  •  sodium chloride 0.9 % flush 10 mL, 10 mL, Intravenous, PRN, Malik Barnard MD  •  sodium chloride 0.9 % infusion, 100 mL/hr, Intravenous, Continuous, Malik Barnard MD, Last Rate: 100 mL/hr at 11/05/20 0451, 100 mL/hr at 11/05/20 0451  •  tiotropium (SPIRIVA RESPIMAT) 2.5 mcg/act aerosol solution inhaler, 2 puff, Inhalation, Daily - RT, Ricardo Terrazas MD, 2 puff at 11/04/20 0803  •  venlafaxine (EFFEXOR) tablet 37.5 mg, 37.5 mg, Oral, BID With Meals, Malik Barnard MD, 37.5 mg at 11/05/20 0809    LABS:  CBC, CMP, CRP, micro reviewed today  Lab Results   Component Value Date    WBC 4.60 11/05/2020    HGB 7.9 (L) 11/05/2020    HCT 24.4 (L) 11/05/2020    MCV 84.7 11/05/2020     11/05/2020     Lab Results   Component Value Date    GLUCOSE 99 11/05/2020    BUN 36 (H) 11/05/2020    CREATININE 2.14 (H) 11/05/2020    EGFRIFNONA 31 (L) 11/05/2020    BCR 16.8 11/05/2020    CO2 25.8 11/05/2020    CALCIUM 8.3 (L) 11/05/2020    ALBUMIN 3.80 11/05/2020    AST 29 11/05/2020    ALT 18 11/05/2020    CRP 6.26 (H) 11/05/2020     Lab Results    Component Value Date    HGBA1C 4.74 (L) 11/03/2020     Lab Results   Component Value Date    CKTOTAL 707 (H) 11/05/2020    TROPONINT 0.364 (C) 11/03/2020     Lab Results   Component Value Date    FERRITIN 77.30 11/05/2020      Microbiology:  11/3 BCx: NGTD  11/3 RPP: + COVID-19  11/3 UCx: 50k mixed    Assessment/Plan   1. COVID-19 infection  2. COPD due to history of tobacco use  3. Acute on chronic hypoxic respiratory failure - resolved  4. Acute kidney injury superimposed upon CKD 2 - better  5. Elevated CK - better  6. Controlled DM2  7. Elevated troponin     He is afebrile and on room air. He says he feels back to his baseline. Dexamethasone has been stopped, and I agree with that decision. No evidence of secondary bacterial infection. Creatinine has markedly improved with IVF fluids.     Thank you for allowing me to be involved in the care of this patient. Infectious diseases will sign off at this time,  but please call me at [phone] if any further ID questions or new ID concerns.       I'll follow-up pulmonary evaluation today. Blood cultures thus far NGTD. He denies any urinary symptoms. I recommend we stop ceftriaxone, but I will follow-up his cultures and clinical course. I suspect procal (similar to the troponin) is affected by his acute kidney injury.     ID will follow. I discussed the case with his RN.

## 2020-11-05 NOTE — PLAN OF CARE
Problem: Adult Inpatient Plan of Care  Goal: Plan of Care Review  Outcome: Ongoing, Progressing  Flowsheets  Taken 11/5/2020 0610  Progress: improving  Outcome Summary: VSS. Remains on RA. No c/o pain. AAOx4. Has not been symptomatic of Covid this shift. Overall, pt is doing very well. Continuing to monitor labs. IVF's continuing. Continuing to monitor.  Taken 11/4/2020 8710  Plan of Care Reviewed With: patient

## 2020-11-05 NOTE — PROGRESS NOTES
"DAILY PROGRESS NOTE  [facility]    Patient Identification:  Name: Mulugeta Falcon  Age: 65 y.o.  Sex: male  :  1955  MRN: [id number]         Primary Care Physician: Provider, No Known    Subjective:  Interval History:He feels better and is more alert.    Objective:    Scheduled Meds:atorvastatin, 20 mg, Oral, Daily  budesonide-formoterol, 2 puff, Inhalation, BID - RT  dilTIAZem CD, 240 mg, Oral, Q24H  gabapentin, 600 mg, Oral, Q8H  heparin (porcine), 5,000 Units, Subcutaneous, Q8H  insulin lispro, 0-7 Units, Subcutaneous, TID AC  metoprolol succinate XL, 50 mg, Oral, Q24H  sodium chloride, 10 mL, Intravenous, Q12H  sodium chloride, 10 mL, Intravenous, Q12H  tiotropium bromide monohydrate, 2 puff, Inhalation, Daily - RT  venlafaxine, 37.5 mg, Oral, BID With Meals      Continuous Infusions:sodium chloride, 100 mL/hr, Last Rate: 100 mL/hr (20 0451)        Vital signs in last 24 hours:  Temp:  [97.1 °F (36.2 °C)-98.4 °F (36.9 °C)] 97.1 °F (36.2 °C)  Heart Rate:  [60-95] 72  Resp:  [16-20] 20  BP: (106-124)/(60-79) 109/69    Intake/Output:    Intake/Output Summary (Last 24 hours) at 2020 1216  Last data filed at 2020 0620  Gross per 24 hour   Intake 2856 ml   Output 2150 ml   Net 706 ml       Exam:  /69 (BP Location: Right arm, Patient Position: Lying)   Pulse 72   Temp 97.1 °F (36.2 °C) (Oral)   Resp 20   Ht 157.5 cm (62\")   Wt 85.1 kg (187 lb 11.2 oz)   SpO2 96%   BMI 34.33 kg/m²     General Appearance:    Alert, cooperative, no distress   Head:    Normocephalic, without obvious abnormality, atraumatic   Eyes:       Throat:   Lips, tongue, gums normal   Neck:   Supple, symmetrical, trachea midline, no JVD   Lungs:     Clear to auscultation bilaterally, respirations unlabored   Chest Wall:    No tenderness or deformity    Heart:    Regular rate and rhythm, S1 and S2 normal, no murmur,no  Rub or gallop   Abdomen:     Soft, nontender, bowel sounds active, no masses, no " organomegaly    Extremities:   Extremities normal, atraumatic, no cyanosis or edema   Pulses:      Skin:   Skin is warm and dry,  no rashes or palpable lesions   Neurologic:   no focal deficits noted      Lab Results (last 72 hours)     Procedure Component Value Units Date/Time    POC Glucose Once [[id number]]  (Abnormal) Collected: 11/04/20 1121    Specimen: Blood Updated: 11/04/20 1128     Glucose 191 mg/dL     Urine Culture - Urine, Urine, Clean Catch [[id number]] Collected: 11/03/20 0813    Specimen: Urine, Clean Catch Updated: 11/04/20 1019     Urine Culture 50,000 CFU/mL Mixed Jake Isolated    Narrative:      Specimen contains mixed organisms of questionable pathogenicity which indicates contamination with commensal jake.  Further identification is unlikely to provide clinically useful information.  Suggest recollection.    Comprehensive Metabolic Panel [[id number]]  (Abnormal) Collected: 11/04/20 0732    Specimen: Blood Updated: 11/04/20 1007     Glucose 116 mg/dL      BUN 44 mg/dL      Creatinine 2.89 mg/dL      Sodium 144 mmol/L      Potassium 3.5 mmol/L      Chloride 104 mmol/L      CO2 24.8 mmol/L      Calcium 8.7 mg/dL      Total Protein 6.4 g/dL      Albumin 3.80 g/dL      ALT (SGPT) 17 U/L      AST (SGOT) 36 U/L      Alkaline Phosphatase 68 U/L      Total Bilirubin 0.2 mg/dL      eGFR Non African Amer 22 mL/min/1.73      Globulin 2.6 gm/dL      A/G Ratio 1.5 g/dL      BUN/Creatinine Ratio 15.2     Anion Gap 15.2 mmol/L     Narrative:      GFR Normal >60  Chronic Kidney Disease <60  Kidney Failure <15      CK [[id number]]  (Abnormal) Collected: 11/04/20 0732    Specimen: Blood Updated: 11/04/20 1004     Creatine Kinase 1,268 U/L     C-reactive Protein [[id number]]  (Abnormal) Collected: 11/04/20 0732    Specimen: Blood Updated: 11/04/20 1004     C-Reactive Protein 11.99 mg/dL     Lactate Dehydrogenase [[id number]]  (Abnormal) Collected: 11/04/20 0732    Specimen: Blood Updated: 11/04/20 1004      U/L      TSH [[id number]]  (Normal) Collected: 11/04/20 0732    Specimen: Blood Updated: 11/04/20 0955     TSH 0.330 uIU/mL     Ferritin [[id number]]  (Normal) Collected: 11/04/20 0732    Specimen: Blood Updated: 11/04/20 0955     Ferritin 77.20 ng/mL     Narrative:      Results may be falsely decreased if patient taking Biotin.      D-dimer, Quantitative [[id number]]  (Abnormal) Collected: 11/04/20 0732    Specimen: Blood Updated: 11/04/20 0927     D-Dimer, Quantitative 0.72 MCGFEU/mL     Narrative:      The Stago D-Dimer test used in conjunction with a clinical pretest probability (PTP) assessment model, has been approved by the FDA to rule out the presence of venous thromboembolism (VTE) in outpatients suspected of deep venous thrombosis (DVT) or pulmonary embolism (PE). The cut-off for negative predictive value is <0.50 MCGFEU/mL.    Fibrinogen [[id number]]  (Normal) Collected: 11/04/20 0732    Specimen: Blood Updated: 11/04/20 0927     Fibrinogen 425 mg/dL     CBC & Differential [[id number]]  (Abnormal) Collected: 11/04/20 0732    Specimen: Blood Updated: 11/04/20 0914    Narrative:      The following orders were created for panel order CBC & Differential.  Procedure                               Abnormality         Status                     ---------                               -----------         ------                     CBC Auto Differential[[id number]]        Abnormal            Final result                 Please view results for these tests on the individual orders.    CBC Auto Differential [[id number]]  (Abnormal) Collected: 11/04/20 0732    Specimen: Blood Updated: 11/04/20 0914     WBC 3.96 10*3/mm3      RBC 3.28 10*6/mm3      Hemoglobin 8.7 g/dL      Hematocrit 27.7 %      MCV 84.5 fL      MCH 26.5 pg      MCHC 31.4 g/dL      RDW 15.2 %      RDW-SD 46.6 fl      MPV 11.0 fL      Platelets 210 10*3/mm3      Neutrophil % 77.7 %      Lymphocyte % 16.7 %      Monocyte % 4.8 %      Eosinophil % 0.3 %      Basophil  % 0.0 %      Immature Grans % 0.5 %      Neutrophils, Absolute 3.08 10*3/mm3      Lymphocytes, Absolute 0.66 10*3/mm3      Monocytes, Absolute 0.19 10*3/mm3      Eosinophils, Absolute 0.01 10*3/mm3      Basophils, Absolute 0.00 10*3/mm3      Immature Grans, Absolute 0.02 10*3/mm3      nRBC 0.0 /100 WBC     Blood Culture - Blood, Arm, Left [[id number]] Collected: 11/03/20 0836    Specimen: Blood from Arm, Left Updated: 11/04/20 0845     Blood Culture No growth at 24 hours    Blood Culture - Blood, Arm, Left [[id number]] Collected: 11/03/20 0825    Specimen: Blood from Arm, Left Updated: 11/04/20 0845     Blood Culture No growth at 24 hours    POC Glucose Once [[id number]]  (Normal) Collected: 11/04/20 0834    Specimen: Blood Updated: 11/04/20 0836     Glucose 120 mg/dL     Urinalysis, Microscopic Only - Urine, Clean Catch [[id number]]  (Abnormal) Collected: 11/04/20 0022    Specimen: Urine, Clean Catch Updated: 11/04/20 0135     RBC, UA 0-2 /HPF      WBC, UA None Seen /HPF      Bacteria, UA Trace /HPF      Squamous Epithelial Cells, UA None Seen /HPF      Hyaline Casts, UA None Seen /LPF      Granular Casts, UA 0-2 /LPF      Methodology Manual Light Microscopy    Urinalysis With Culture If Indicated - Urine, Clean Catch [[id number]]  (Abnormal) Collected: 11/04/20 0022    Specimen: Urine, Clean Catch Updated: 11/04/20 0120     Color, UA Yellow     Appearance, UA Clear     pH, UA <=5.0     Specific Gravity, UA 1.010     Glucose, UA Negative     Ketones, UA Negative     Bilirubin, UA Negative     Blood, UA Moderate (2+)     Protein, UA Trace     Leuk Esterase, UA Negative     Nitrite, UA Negative     Urobilinogen, UA 0.2 E.U./dL    Hemoglobin A1c [[id number]]  (Abnormal) Collected: 11/03/20 0825    Specimen: Blood Updated: 11/03/20 2048     Hemoglobin A1C 4.74 %     Narrative:      Hemoglobin A1C Ranges:    Increased Risk for Diabetes  5.7% to 6.4%  Diabetes                     >= 6.5%  Diabetic Goal                < 7.0%     POC Glucose Once [[id number]]  (Abnormal) Collected: 11/03/20 2030    Specimen: Blood Updated: 11/03/20 2031     Glucose 138 mg/dL     Troponin [[id number]]  (Abnormal) Collected: 11/03/20 1807    Specimen: Blood Updated: 11/03/20 1852     Troponin T 0.364 ng/mL     Narrative:      Troponin T Reference Range:  <= 0.03 ng/mL-   Negative for AMI  >0.03 ng/mL-     Abnormal for myocardial necrosis.  Clinicians would have to utilize clinical acumen, EKG, Troponin and serial changes to determine if it is an Acute Myocardial Infarction or myocardial injury due to an underlying chronic condition.       Results may be falsely decreased if patient taking Biotin.      D-dimer, Quantitative [[id number]]  (Abnormal) Collected: 11/03/20 1807    Specimen: Blood Updated: 11/03/20 1833     D-Dimer, Quantitative 0.75 MCGFEU/mL     Narrative:      The Stago D-Dimer test used in conjunction with a clinical pretest probability (PTP) assessment model, has been approved by the FDA to rule out the presence of venous thromboembolism (VTE) in outpatients suspected of deep venous thrombosis (DVT) or pulmonary embolism (PE). The cut-off for negative predictive value is <0.50 MCGFEU/mL.    Procalcitonin [[id number]]  (Abnormal) Collected: 11/03/20 0952    Specimen: Blood Updated: 11/03/20 1643     Procalcitonin 1.55 ng/mL     Narrative:      As a Marker for Sepsis (Non-Neonates):   1. <0.5 ng/mL represents a low risk of severe sepsis and/or septic shock.  1. >2 ng/mL represents a high risk of severe sepsis and/or septic shock.    As a Marker for Lower Respiratory Tract Infections that require antibiotic therapy:  PCT on Admission     Antibiotic Therapy             6-12 Hrs later  > 0.5                Strongly Recommended            >0.25 - <0.5         Recommended  0.1 - 0.25           Discouraged                   Remeasure/reassess PCT  <0.1                 Strongly Discouraged          Remeasure/reassess PCT      As 28 day mortality risk marker:  "\"Change in Procalcitonin Result\" (> 80 % or <=80 %) if Day 0 (or Day 1) and Day 4 values are available. Refer to http://www.[facility]-pct-calculator.com/   Change in PCT <=80 %   A decrease of PCT levels below or equal to 80 % defines a positive change in PCT test result representing a higher risk for 28-day all-cause mortality of patients diagnosed with severe sepsis or septic shock.  Change in PCT > 80 %   A decrease of PCT levels of more than 80 % defines a negative change in PCT result representing a lower risk for 28-day all-cause mortality of patients diagnosed with severe sepsis or septic shock.                Results may be falsely decreased if patient taking Biotin.     Ferritin [[id number]]  (Normal) Collected: 11/03/20 0952    Specimen: Blood Updated: 11/03/20 1640     Ferritin 54.30 ng/mL     Narrative:      Results may be falsely decreased if patient taking Biotin.      Lactate Dehydrogenase [[id number]]  (Abnormal) Collected: 11/03/20 0952    Specimen: Blood Updated: 11/03/20 1638      U/L      Comment: Specimen hemolyzed.  Results may be affected.       Lactic Acid, Reflex [[id number]]  (Normal) Collected: 11/03/20 1409    Specimen: Blood Updated: 11/03/20 1503     Lactate 1.2 mmol/L     Lactic Acid, Reflex Timer (This will reflex a repeat order 3-3:15 hours after ordered.) [[id number]] Collected: 11/03/20 0825    Specimen: Blood Updated: 11/03/20 1200     Hold Tube Hold for add-ons.     Comment: Auto resulted.       Troponin [[id number]]  (Abnormal) Collected: 11/03/20 0952    Specimen: Blood Updated: 11/03/20 1134     Troponin T 0.291 ng/mL     Narrative:      Troponin T Reference Range:  <= 0.03 ng/mL-   Negative for AMI  >0.03 ng/mL-     Abnormal for myocardial necrosis.  Clinicians would have to utilize clinical acumen, EKG, Troponin and serial changes to determine if it is an Acute Myocardial Infarction or myocardial injury due to an underlying chronic condition.       Results may be falsely " decreased if patient taking Biotin.      Urinalysis, Microscopic Only - Urine, Clean Catch [[id number]]  (Abnormal) Collected: 11/03/20 0813    Specimen: Urine, Clean Catch Updated: 11/03/20 1028     RBC, UA 0-2 /HPF      WBC, UA 6-12 /HPF      Bacteria, UA 1+ /HPF      Squamous Epithelial Cells, UA 0-2 /HPF      Hyaline Casts, UA None Seen /LPF      Amorphous Crystals, UA Small/1+ /HPF      Methodology Manual Light Microscopy    Respiratory Panel PCR w/COVID-19(SARS-CoV-2) ANH/EFRAIN/GEO/PAD/COR/MAD/PACHECO In-House, NP Swab in UTM/VTM, 3-4 HR TAT - Swab, Nasopharynx [[id number]]  (Abnormal) Collected: 11/03/20 0825    Specimen: Swab from Nasopharynx Updated: 11/03/20 1009     ADENOVIRUS, PCR Not Detected     Coronavirus 229E Not Detected     Coronavirus HKU1 Not Detected     Coronavirus NL63 Not Detected     Coronavirus OC43 Not Detected     COVID19 Detected     Human Metapneumovirus Not Detected     Human Rhinovirus/Enterovirus Not Detected     Influenza A PCR Not Detected     Influenza A H1 Not Detected     Influenza A H1 2009 PCR Not Detected     Influenza A H3 Not Detected     Influenza B PCR Not Detected     Parainfluenza Virus 1 Not Detected     Parainfluenza Virus 2 Not Detected     Parainfluenza Virus 3 Not Detected     Parainfluenza Virus 4 Not Detected     RSV, PCR Not Detected     Bordetella pertussis pcr Not Detected     Bordetella parapertussis PCR Not Detected     Chlamydophila pneumoniae PCR Not Detected     Mycoplasma pneumo by PCR Not Detected    Narrative:      Fact sheet for providers: https://docs.[employer]/wp-content/uploads/JQB3675-3103-KF5.1-EUA-Provider-Fact-Sheet-3.pdf    Fact sheet for patients: https://docs.[employer]/wp-content/uploads/FAR6505-3431-TW1.1-EUA-Patient-Fact-Sheet-1.pdf    Urinalysis With Culture If Indicated - Urine, Clean Catch [[id number]]  (Abnormal) Collected: 11/03/20 0813    Specimen: Urine, Clean Catch Updated: 11/03/20 0959     Color, UA Yellow     Appearance, UA  "Cloudy     pH, UA <=5.0     Specific Gravity, UA 1.016     Glucose, UA Negative     Ketones, UA Trace     Bilirubin, UA Negative     Blood, UA Moderate (2+)     Protein, UA 30 mg/dL (1+)     Leuk Esterase, UA Negative     Nitrite, UA Negative     Urobilinogen, UA 0.2 E.U./dL    CK [[id number]]  (Abnormal) Collected: 11/03/20 0825    Specimen: Blood Updated: 11/03/20 0951     Creatine Kinase 1,588 U/L     Procalcitonin [[id number]]  (Abnormal) Collected: 11/03/20 0825    Specimen: Blood Updated: 11/03/20 0924     Procalcitonin 1.65 ng/mL     Narrative:      As a Marker for Sepsis (Non-Neonates):   1. <0.5 ng/mL represents a low risk of severe sepsis and/or septic shock.  1. >2 ng/mL represents a high risk of severe sepsis and/or septic shock.    As a Marker for Lower Respiratory Tract Infections that require antibiotic therapy:  PCT on Admission     Antibiotic Therapy             6-12 Hrs later  > 0.5                Strongly Recommended            >0.25 - <0.5         Recommended  0.1 - 0.25           Discouraged                   Remeasure/reassess PCT  <0.1                 Strongly Discouraged          Remeasure/reassess PCT      As 28 day mortality risk marker: \"Change in Procalcitonin Result\" (> 80 % or <=80 %) if Day 0 (or Day 1) and Day 4 values are available. Refer to http://www.[facility]-pct-calculator.com/   Change in PCT <=80 %   A decrease of PCT levels below or equal to 80 % defines a positive change in PCT test result representing a higher risk for 28-day all-cause mortality of patients diagnosed with severe sepsis or septic shock.  Change in PCT > 80 %   A decrease of PCT levels of more than 80 % defines a negative change in PCT result representing a lower risk for 28-day all-cause mortality of patients diagnosed with severe sepsis or septic shock.                Results may be falsely decreased if patient taking Biotin.     Troponin [[id number]]  (Abnormal) Collected: 11/03/20 0825    Specimen: Blood " Updated: 11/03/20 0921     Troponin T 0.369 ng/mL     Narrative:      Troponin T Reference Range:  <= 0.03 ng/mL-   Negative for AMI  >0.03 ng/mL-     Abnormal for myocardial necrosis.  Clinicians would have to utilize clinical acumen, EKG, Troponin and serial changes to determine if it is an Acute Myocardial Infarction or myocardial injury due to an underlying chronic condition.       Results may be falsely decreased if patient taking Biotin.      Comprehensive Metabolic Panel [[id number]]  (Abnormal) Collected: 11/03/20 0825    Specimen: Blood Updated: 11/03/20 0920     Glucose 102 mg/dL      BUN 48 mg/dL      Creatinine 4.03 mg/dL      Sodium 138 mmol/L      Potassium 3.6 mmol/L      Chloride 97 mmol/L      CO2 24.2 mmol/L      Calcium 9.0 mg/dL      Total Protein 7.6 g/dL      Albumin 4.80 g/dL      ALT (SGPT) 16 U/L      AST (SGOT) 37 U/L      Alkaline Phosphatase 83 U/L      Total Bilirubin 0.3 mg/dL      eGFR Non African Amer 15 mL/min/1.73      Globulin 2.8 gm/dL      A/G Ratio 1.7 g/dL      BUN/Creatinine Ratio 11.9     Anion Gap 16.8 mmol/L     Narrative:      GFR Normal >60  Chronic Kidney Disease <60  Kidney Failure <15      Acetaminophen Level [[id number]]  (Normal) Collected: 11/03/20 0825    Specimen: Blood Updated: 11/03/20 0920     Acetaminophen <5.0 mcg/mL     Ethanol [[id number]] Collected: 11/03/20 0825    Specimen: Blood Updated: 11/03/20 0920     Ethanol <10 mg/dL      Ethanol % <0.010 %     Salicylate Level [[id number]]  (Normal) Collected: 11/03/20 0825    Specimen: Blood Updated: 11/03/20 0920     Salicylate <0.3 mg/dL     Narrative:      Therapeutic range for Salicylates:  3.0 - 10.0 mg/dL for antipyretic/analgesic conditions  15.0 - 30.0 mg/dL for anti-inflammatory conditions    BNP [[id number]]  (Abnormal) Collected: 11/03/20 0825    Specimen: Blood Updated: 11/03/20 0917     proBNP 2,922.0 pg/mL     Narrative:      Among patients with dyspnea, NT-proBNP is highly sensitive for the detection  of acute congestive heart failure. In addition NT-proBNP of <300 pg/ml effectively rules out acute congestive heart failure with 99% negative predictive value.    Results may be falsely decreased if patient taking Biotin.      Lactic Acid, Plasma [[id number]]  (Abnormal) Collected: 11/03/20 0825    Specimen: Blood Updated: 11/03/20 0859     Lactate 2.1 mmol/L     Urine Drug Screen - Urine, Clean Catch [[id number]]  (Abnormal) Collected: 11/03/20 0813    Specimen: Urine, Clean Catch Updated: 11/03/20 0856     Amphet/Methamphet, Screen Negative     Barbiturates Screen, Urine Negative     Benzodiazepine Screen, Urine Negative     Cocaine Screen, Urine Negative     Opiate Screen Negative     THC, Screen, Urine Negative     Methadone Screen, Urine Negative     Oxycodone Screen, Urine Positive    Narrative:      Negative Thresholds For Drugs Screened:     Amphetamines               500 ng/ml   Barbiturates               200 ng/ml   Benzodiazepines            100 ng/ml   Cocaine                    300 ng/ml   Methadone                  300 ng/ml   Opiates                    300 ng/ml   Oxycodone                  100 ng/ml   THC                        50 ng/ml    The Normal Value for all drugs tested is negative. This report includes final unconfirmed screening results to be used for medical treatment purposes only. Unconfirmed results must not be used for non-medical purposes such as employment or legal testing. Clinical consideration should be applied to any drug of abuse test, particulary when unconfirmed results are used.    CBC & Differential [[id number]]  (Abnormal) Collected: 11/03/20 0825    Specimen: Blood Updated: 11/03/20 0851    Narrative:      The following orders were created for panel order CBC & Differential.  Procedure                               Abnormality         Status                     ---------                               -----------         ------                     CBC Auto  Differential[[id number]]        Abnormal            Final result                 Please view results for these tests on the individual orders.    CBC Auto Differential [[id number]]  (Abnormal) Collected: 11/03/20 0825    Specimen: Blood Updated: 11/03/20 0851     WBC 7.38 10*3/mm3      RBC 3.75 10*6/mm3      Hemoglobin 10.0 g/dL      Hematocrit 31.8 %      MCV 84.8 fL      MCH 26.7 pg      MCHC 31.4 g/dL      RDW 15.2 %      RDW-SD 47.2 fl      MPV 10.5 fL      Platelets 254 10*3/mm3      Neutrophil % 83.2 %      Lymphocyte % 10.0 %      Monocyte % 6.0 %      Eosinophil % 0.3 %      Basophil % 0.1 %      Immature Grans % 0.4 %      Neutrophils, Absolute 6.14 10*3/mm3      Lymphocytes, Absolute 0.74 10*3/mm3      Monocytes, Absolute 0.44 10*3/mm3      Eosinophils, Absolute 0.02 10*3/mm3      Basophils, Absolute 0.01 10*3/mm3      Immature Grans, Absolute 0.03 10*3/mm3      nRBC 0.0 /100 WBC         Data Review:  Results from last 7 days   Lab Units 11/05/20  0658 11/04/20  0732 11/03/20  0825   SODIUM mmol/L 143 144 138   POTASSIUM mmol/L 4.0 3.5 3.6   CHLORIDE mmol/L 107 104 97*   CO2 mmol/L 25.8 24.8 24.2   BUN mg/dL 36* 44* 48*   CREATININE mg/dL 2.14* 2.89* 4.03*   GLUCOSE mg/dL 99 116* 102*   CALCIUM mg/dL 8.3* 8.7 9.0     Results from last 7 days   Lab Units 11/05/20  0658 11/04/20  0732 11/03/20  0825   WBC 10*3/mm3 4.60 3.96 7.38   HEMOGLOBIN g/dL 7.9* 8.7* 10.0*   HEMATOCRIT % 24.4* 27.7* 31.8*   PLATELETS 10*3/mm3 203 210 254     Results from last 7 days   Lab Units 11/04/20  0732   TSH uIU/mL 0.330     Results from last 7 days   Lab Units 11/03/20  0825   HEMOGLOBIN A1C % 4.74*     Lab Results   Lab Value Date/Time    TROPONINT 0.364 (C) 11/03/2020 1807    TROPONINT 0.291 (C) 11/03/2020 0952    TROPONINT 0.369 (C) 11/03/2020 0825         Results from last 7 days   Lab Units 11/05/20  0658 11/04/20  0732 11/03/20  0825   ALK PHOS U/L 60 68 83   BILIRUBIN mg/dL <0.2 0.2 0.3   ALT (SGPT) U/L 18 17 16   AST  (SGOT) U/L 29 36 37     Results from last 7 days   Lab Units 11/04/20  0732   TSH uIU/mL 0.330     Results from last 7 days   Lab Units 11/03/20  0825   HEMOGLOBIN A1C % 4.74*     Glucose   Date/Time Value Ref Range Status   11/05/2020 1159 102 70 - 130 mg/dL Final   11/05/2020 0619 112 70 - 130 mg/dL Final   11/04/2020 2021 147 (H) 70 - 130 mg/dL Final   11/04/2020 1654 127 70 - 130 mg/dL Final   11/04/2020 1121 191 (H) 70 - 130 mg/dL Final   11/04/2020 0834 120 70 - 130 mg/dL Final   11/03/2020 2030 138 (H) 70 - 130 mg/dL Final           Past Medical History:   Diagnosis Date   • A-fib (CMS/[facility])    • COPD (chronic obstructive pulmonary disease) (CMS/[facility])    • Diabetes mellitus (CMS/[facility])     type 2   • Emphysema lung (CMS/[facility])    • Herniated disc, cervical    • Hypertension    • Hypoxia [phone]   • Renal disorder        Assessment:  Active Hospital Problems    Diagnosis  POA   • **KRISTIE (acute kidney injury) (CMS/[facility]) [N17.9]  Yes   • Rhabdomyolysis [M62.82]  Unknown   • A-fib (CMS/[facility]) [I48.91]  Unknown   • COPD (chronic obstructive pulmonary disease) (CMS/[facility]) [J44.9]  Unknown   • Hypertension [I10]  Unknown   • Diabetes mellitus (CMS/[facility]) [E11.9]  Unknown   • Renal disorder [N28.9]  Unknown   • Hypoxia [R09.02]  Unknown   • UTI (urinary tract infection) [N39.0]  Unknown   • Elevated troponin [R77.8]  Unknown   • Pneumonia due to COVID-19 virus [U07.1, J12.89]  Unknown   • Acute respiratory failure with hypoxia (CMS/[facility]) [J96.01]  Unknown      Resolved Hospital Problems   No resolved problems to display.       Plan:  ID,pulmonary, renal and cardiology consults noted.  Continue with some IV fluid hydration.  Will get follow-up lab studies. Probably home tomorrow if renal function better.    Malik Barnard MD  11/5/2020  12:16 EST

## 2020-11-05 NOTE — PROGRESS NOTES
"                                              LOS: 1 day   Patient Care Team:  Provider, No Known as PCP - General    Chief Complaint:  F/up COVID-19 infection medical problems history of    Subjective   Interval History   Denies shortness of breath or cough.  On RA.    REVIEW OF SYSTEMS:     GASTROINTESTINAL: No anorexia, nausea, vomiting or diarrhea. No abdominal pain or blood.   HEMATOLOGIC: Fever 101 on admission declined since then.  No chills    Ventilator/Non-Invasive Ventilation Settings (From admission, onward)    None                Physical Exam:     Vital Signs  Temp:  [97.5 °F (36.4 °C)-99.6 °F (37.6 °C)] 97.5 °F (36.4 °C)  Heart Rate:  [] 81  Resp:  [16-20] 18  BP: (106-124)/(60-81) 106/61    Intake/Output Summary (Last 24 hours) at 11/4/2020 2249  Last data filed at 11/4/2020 2144  Gross per 24 hour   Intake 200 ml   Output 1700 ml   Net -1500 ml     Flowsheet Rows      First Filed Value   Admission Height  157.5 cm (62\") Documented at 11/03/2020 0941   Admission Weight  85.1 kg (187 lb 11.2 oz) Documented at 11/03/2020 0941          General Appearance:   Alert, cooperative, in no acute distress   ENMT:  Mallampati score 3, moist mucous membrane   Eyes:  Pupils equal and reactive to light. EOMI   Neck:   Trachea midline. No thyromegaly.   Lungs:    Clear to auscultation bilaterally.  No crackles or wheezing.  Significantly diminished breath sounds bilaterally.    Heart:   Regular rhythm and normal rate, normal S1 and S2, no         murmur   Skin:   No rash   Abdomen:    Soft. No tenderness. No HSM.   Neuro:  Conscious, alert, oriented x3. Strength 5/5 in upper and lower  ext   Extremities:  No cyanosis, clubbing or edema.  Warm extremities and well-perfused          Results Review:        Results from last 7 days   Lab Units 11/04/20  0732 11/03/20  0825   SODIUM mmol/L 144 138   POTASSIUM mmol/L 3.5 3.6   CHLORIDE mmol/L 104 97*   CO2 mmol/L 24.8 24.2   BUN mg/dL 44* 48*   CREATININE mg/dL " 2.89* 4.03*   GLUCOSE mg/dL 116* 102*   CALCIUM mg/dL 8.7 9.0     Results from last 7 days   Lab Units 11/04/20  0732 11/03/20  1807 11/03/20  0952 11/03/20  0825   CK TOTAL U/L 1,268*  --   --  1,588*   TROPONIN T ng/mL  --  0.364* 0.291* 0.369*     Results from last 7 days   Lab Units 11/04/20  0732 11/03/20  0825   WBC 10*3/mm3 3.96 7.38   HEMOGLOBIN g/dL 8.7* 10.0*   HEMATOCRIT % 27.7* 31.8*   PLATELETS 10*3/mm3 210 254         Results from last 7 days   Lab Units 11/03/20  0825   PROBNP pg/mL 2,922.0*       I reviewed the patient's new clinical results.        Medication Review:   atorvastatin, 20 mg, Oral, Daily  budesonide-formoterol, 2 puff, Inhalation, BID - RT  dexamethasone, 6 mg, Oral, Daily With Breakfast  dilTIAZem CD, 240 mg, Oral, Q24H  gabapentin, 600 mg, Oral, Q8H  heparin (porcine), 5,000 Units, Subcutaneous, Q8H  insulin lispro, 0-7 Units, Subcutaneous, TID AC  metoprolol succinate XL, 50 mg, Oral, Q24H  sodium chloride, 10 mL, Intravenous, Q12H  sodium chloride, 10 mL, Intravenous, Q12H  tiotropium bromide monohydrate, 2 puff, Inhalation, Daily - RT  venlafaxine, 37.5 mg, Oral, BID With Meals        sodium chloride, 100 mL/hr, Last Rate: 100 mL/hr (11/04/20 0837)          Assessment     1. COVID-19 infection  2. Right lower lobe atelectasis  3. COPD, no exacerbation  4. Chronic hypoxia ?  5. KRISTIE, improving  6. Chronic anemia        Plan     · On RA. SpO2 normal. I stopped Decadron. He has no pulmonary infiltrates and currently on RA  · Continue IV hydration  · Can use his Oxygen on exertion (He already has a concentrator).  · Continue maintenance Symbicort and Spiriva for COPD.  He can resume his Trelegy inhaler as outpatient    Otherwise not much to add from pulmonary perspective. Will sign off. He can probably be discharged home after his creatinine improves        Ricardo Terrazas MD  11/04/20  22:49 EST            This note was dictated utilizing Fitfully dictation

## 2020-11-05 NOTE — PROGRESS NOTES
"   LOS: 2 days     Chief Complaint/ Reason for encounter: CKD II  Chief Complaint   Patient presents with   • Altered Mental Status   • Shortness of Breath   • Hypoglycemia   • Fever         Subjective   No new complaints today      Medical history reviewed:  History of Present Illness    Subjective:  Symptoms:  He reports shortness of breath.        History taken from: Patient and chart    Vital Signs  Temp:  [97.1 °F (36.2 °C)-98.4 °F (36.9 °C)] 97.1 °F (36.2 °C)  Heart Rate:  [60-95] 72  Resp:  [16-20] 20  BP: (106-124)/(60-79) 109/69       Wt Readings from Last 1 Encounters:   11/03/20 0941 85.1 kg (187 lb 11.2 oz)       Objective:  Vital signs: (most recent): Blood pressure 109/69, pulse 72, temperature 97.1 °F (36.2 °C), temperature source Oral, resp. rate 20, height 157.5 cm (62\"), weight 85.1 kg (187 lb 11.2 oz), SpO2 96 %.  Fever.              Objective:  General Appearance:  Comfortable, mildly ill-appearing, in no acute distress and not in pain.  Awake, alert, oriented  HEENT: Mucous membranes moist, no injury, oropharynx clear  Lungs:  Normal effort and normal respiratory rate.  Breath sounds clear to auscultation.  No  respiratory distress.  No rales, decreased breath sounds or rhonchi.    Heart: Normal rate.  Regular rhythm.  S1 normal.  No murmur.   Abdomen: Abdomen is soft.  Bowel sounds are normal, no abdominal tenderness.  There is no rebound or guarding  Extremities: Normal range of motion.  No edema noted in bilateral lower extremities, distal pulses intact  Neurological: No focal motor or sensory deficits, pupils reactive  Skin:  Warm and dry.  No rash or cyanosis.       Results Review:    Intake/Output:     Intake/Output Summary (Last 24 hours) at 11/5/2020 1248  Last data filed at 11/5/2020 0620  Gross per 24 hour   Intake 2856 ml   Output 2150 ml   Net 706 ml         DATA:  Radiology and Labs:  The following labs independently reviewed by me. Additional labs ordered for tomorrow " a.m.  Interval notes, chart personally reviewed by me.   Old records independently reviewed showing Cr baseline CKD III  The following radiologic studies independently viewed by me, findings no obstruction noted  New problems include KRISTIE  Discussed with Dr Ladd     Risk/ complexity of medical care/ medical decision making moderate    Labs:   Recent Results (from the past 24 hour(s))   POC Glucose Once    Collection Time: 11/04/20  4:54 PM    Specimen: Blood   Result Value Ref Range    Glucose 127 70 - 130 mg/dL   POC Glucose Once    Collection Time: 11/04/20  8:21 PM    Specimen: Blood   Result Value Ref Range    Glucose 147 (H) 70 - 130 mg/dL   POC Glucose Once    Collection Time: 11/05/20  6:19 AM    Specimen: Blood   Result Value Ref Range    Glucose 112 70 - 130 mg/dL   Comprehensive Metabolic Panel    Collection Time: 11/05/20  6:58 AM    Specimen: Blood   Result Value Ref Range    Glucose 99 65 - 99 mg/dL    BUN 36 (H) 8 - 23 mg/dL    Creatinine 2.14 (H) 0.76 - 1.27 mg/dL    Sodium 143 136 - 145 mmol/L    Potassium 4.0 3.5 - 5.2 mmol/L    Chloride 107 98 - 107 mmol/L    CO2 25.8 22.0 - 29.0 mmol/L    Calcium 8.3 (L) 8.6 - 10.5 mg/dL    Total Protein 5.9 (L) 6.0 - 8.5 g/dL    Albumin 3.80 3.50 - 5.20 g/dL    ALT (SGPT) 18 1 - 41 U/L    AST (SGOT) 29 1 - 40 U/L    Alkaline Phosphatase 60 39 - 117 U/L    Total Bilirubin <0.2 0.0 - 1.2 mg/dL    eGFR Non African Amer 31 (L) >60 mL/min/1.73    Globulin 2.1 gm/dL    A/G Ratio 1.8 g/dL    BUN/Creatinine Ratio 16.8 7.0 - 25.0    Anion Gap 10.2 5.0 - 15.0 mmol/L   CK    Collection Time: 11/05/20  6:58 AM    Specimen: Blood   Result Value Ref Range    Creatine Kinase 707 (H) 20 - 200 U/L   C-reactive Protein    Collection Time: 11/05/20  6:58 AM    Specimen: Blood   Result Value Ref Range    C-Reactive Protein 6.26 (H) 0.00 - 0.50 mg/dL   Ferritin    Collection Time: 11/05/20  6:58 AM    Specimen: Blood   Result Value Ref Range    Ferritin 77.30 30.00 - 400.00 ng/mL    CBC Auto Differential    Collection Time: 11/05/20  6:58 AM    Specimen: Blood   Result Value Ref Range    WBC 4.60 3.40 - 10.80 10*3/mm3    RBC 2.88 (L) 4.14 - 5.80 10*6/mm3    Hemoglobin 7.9 (L) 13.0 - 17.7 g/dL    Hematocrit 24.4 (L) 37.5 - 51.0 %    MCV 84.7 79.0 - 97.0 fL    MCH 27.4 26.6 - 33.0 pg    MCHC 32.4 31.5 - 35.7 g/dL    RDW 15.3 12.3 - 15.4 %    RDW-SD 47.7 37.0 - 54.0 fl    MPV 11.2 6.0 - 12.0 fL    Platelets 203 140 - 450 10*3/mm3    Neutrophil % 66.3 42.7 - 76.0 %    Lymphocyte % 23.5 19.6 - 45.3 %    Monocyte % 9.8 5.0 - 12.0 %    Eosinophil % 0.0 (L) 0.3 - 6.2 %    Basophil % 0.0 0.0 - 1.5 %    Immature Grans % 0.4 0.0 - 0.5 %    Neutrophils, Absolute 3.05 1.70 - 7.00 10*3/mm3    Lymphocytes, Absolute 1.08 0.70 - 3.10 10*3/mm3    Monocytes, Absolute 0.45 0.10 - 0.90 10*3/mm3    Eosinophils, Absolute 0.00 0.00 - 0.40 10*3/mm3    Basophils, Absolute 0.00 0.00 - 0.20 10*3/mm3    Immature Grans, Absolute 0.02 0.00 - 0.05 10*3/mm3    nRBC 0.0 0.0 - 0.2 /100 WBC   POC Glucose Once    Collection Time: 11/05/20 11:59 AM    Specimen: Blood   Result Value Ref Range    Glucose 102 70 - 130 mg/dL       Radiology:  Imaging Results (Last 24 Hours)     ** No results found for the last 24 hours. **             Medications have been reviewed:  Current Facility-Administered Medications   Medication Dose Route Frequency Provider Last Rate Last Dose   • acetaminophen (TYLENOL) tablet 650 mg  650 mg Oral Q4H PRN Malik Barnard MD        Or   • acetaminophen (TYLENOL) 160 MG/5ML solution 650 mg  650 mg Oral Q4H PRN Malik Barnard MD        Or   • acetaminophen (TYLENOL) suppository 650 mg  650 mg Rectal Q4H PRN Malik Barnard MD       • albuterol sulfate HFA (PROVENTIL HFA;VENTOLIN HFA;PROAIR HFA) inhaler 2 puff  2 puff Inhalation Q6H PRN Ricardo Terrazas MD       • atorvastatin (LIPITOR) tablet 20 mg  20 mg Oral Daily Malik Barnard MD   20 mg at 11/05/20 0809   • budesonide-formoterol (SYMBICORT) 160-4.5 MCG/ACT  inhaler 2 puff  2 puff Inhalation BID - RT Malik Barnard MD   2 puff at 11/05/20 1103   • dextrose (D50W) 25 g/ 50mL Intravenous Solution 25 g  25 g Intravenous Q15 Min PRN Malik Barnard MD       • dextrose (GLUTOSE) oral gel 15 g  15 g Oral Q15 Min PRN Malik Barnard MD       • dilTIAZem CD (CARDIZEM CD) 24 hr capsule 240 mg  240 mg Oral Q24H Malik Barnard MD   240 mg at 11/05/20 0809   • gabapentin (NEURONTIN) capsule 600 mg  600 mg Oral Q8H Malik Barnard MD   600 mg at 11/05/20 0621   • glucagon (human recombinant) (GLUCAGEN DIAGNOSTIC) injection 1 mg  1 mg Subcutaneous Q15 Min PRN Malik Barnard MD       • heparin (porcine) 5000 UNIT/ML injection 5,000 Units  5,000 Units Subcutaneous Q8H Malik Barnard MD   5,000 Units at 11/05/20 0622   • insulin lispro (humaLOG) injection 0-7 Units  0-7 Units Subcutaneous TID AC Malik Barnard MD   2 Units at 11/04/20 1341   • metoprolol succinate XL (TOPROL-XL) 24 hr tablet 50 mg  50 mg Oral Q24H Malik Barnard MD   50 mg at 11/05/20 0809   • ondansetron (ZOFRAN) tablet 4 mg  4 mg Oral Q6H PRN Andrea Barrett APRN        Or   • ondansetron (ZOFRAN) injection 4 mg  4 mg Intravenous Q6H PRN Andrea Barrett, PAMELA       • sodium chloride 0.9 % flush 10 mL  10 mL Intravenous PRN Balaji Rodarte MD       • sodium chloride 0.9 % flush 10 mL  10 mL Intravenous Q12H Malik Barnard MD   10 mL at 11/05/20 0810   • sodium chloride 0.9 % flush 10 mL  10 mL Intravenous PRN Malik Barnard MD       • sodium chloride 0.9 % flush 10 mL  10 mL Intravenous Q12H Malik Barnard MD   10 mL at 11/05/20 0809   • sodium chloride 0.9 % flush 10 mL  10 mL Intravenous PRN Malik Barnard MD       • sodium chloride 0.9 % infusion  100 mL/hr Intravenous Continuous Malik Barnard  mL/hr at 11/05/20 0451 100 mL/hr at 11/05/20 0451   • tiotropium (SPIRIVA RESPIMAT) 2.5 mcg/act aerosol solution inhaler  2 puff Inhalation Daily - RT Ricardo Terrazas MD   2 puff at 11/05/20 1103   •  venlafaxine (EFFEXOR) tablet 37.5 mg  37.5 mg Oral BID With Meals Malik Barnard MD   37.5 mg at 11/05/20 0809       ASSESSMENT:  New, acute kidney injury, likely prerenal, hypoxemia and some mild hypotension likely contributing factors  CKD stage II, baseline creatinine around 1.0  Atrial fibrillation  Acute hypoxemic respiratory failure  COVID-19 pneumonia  Hypoxemia  Urinary tract infection  Anemia  Elevated troponin, likely in part due to his CKD  Elevated CK, rule out rhabdo  Lactic acidosis hypoxemia hypotension    PLAN  Agree with gentle IV fluids  CK continues to trend down   Cr trending down, baseline 1.0  Continue to hold diuretics and Metformin  IV antibiotics, renally dosed  CT shows no obstruction so no need for dedicated renal ultrasound at this time  May need swallow eval patient reported chronic dysphagia   Zofran for nausea    Recheck CK in a.m.    PAMELA Cook  [facility]   Office phone number: [phone]  Answering service phone number: [phone]    11/05/20  12:48 EST

## 2020-11-06 ENCOUNTER — READMISSION MANAGEMENT (OUTPATIENT)
Dept: CALL CENTER | Facility: HOSPITAL | Age: 65
End: 2020-11-06

## 2020-11-06 VITALS
OXYGEN SATURATION: 93 % | TEMPERATURE: 97.6 F | HEART RATE: 97 BPM | DIASTOLIC BLOOD PRESSURE: 77 MMHG | SYSTOLIC BLOOD PRESSURE: 116 MMHG | HEIGHT: 62 IN | RESPIRATION RATE: 16 BRPM | BODY MASS INDEX: 34.54 KG/M2 | WEIGHT: 187.7 LBS

## 2020-11-06 LAB
ANION GAP SERPL CALCULATED.3IONS-SCNC: 8.7 MMOL/L (ref 5–15)
BUN SERPL-MCNC: 27 MG/DL (ref 8–23)
BUN/CREAT SERPL: 17.4 (ref 7–25)
CALCIUM SPEC-SCNC: 8.1 MG/DL (ref 8.6–10.5)
CHLORIDE SERPL-SCNC: 110 MMOL/L (ref 98–107)
CO2 SERPL-SCNC: 26.3 MMOL/L (ref 22–29)
CREAT SERPL-MCNC: 1.55 MG/DL (ref 0.76–1.27)
DEPRECATED RDW RBC AUTO: 46.1 FL (ref 37–54)
ERYTHROCYTE [DISTWIDTH] IN BLOOD BY AUTOMATED COUNT: 15.2 % (ref 12.3–15.4)
GFR SERPL CREATININE-BSD FRML MDRD: 45 ML/MIN/1.73
GLUCOSE BLDC GLUCOMTR-MCNC: 101 MG/DL (ref 70–130)
GLUCOSE BLDC GLUCOMTR-MCNC: 95 MG/DL (ref 70–130)
GLUCOSE SERPL-MCNC: 77 MG/DL (ref 65–99)
HCT VFR BLD AUTO: 24.6 % (ref 37.5–51)
HGB BLD-MCNC: 8 G/DL (ref 13–17.7)
MCH RBC QN AUTO: 26.8 PG (ref 26.6–33)
MCHC RBC AUTO-ENTMCNC: 32.5 G/DL (ref 31.5–35.7)
MCV RBC AUTO: 82.6 FL (ref 79–97)
PLATELET # BLD AUTO: 218 10*3/MM3 (ref 140–450)
PMV BLD AUTO: 11.1 FL (ref 6–12)
POTASSIUM SERPL-SCNC: 3.5 MMOL/L (ref 3.5–5.2)
RBC # BLD AUTO: 2.98 10*6/MM3 (ref 4.14–5.8)
SODIUM SERPL-SCNC: 145 MMOL/L (ref 136–145)
WBC # BLD AUTO: 5.57 10*3/MM3 (ref 3.4–10.8)

## 2020-11-06 PROCEDURE — 85027 COMPLETE CBC AUTOMATED: CPT | Performed by: INTERNAL MEDICINE

## 2020-11-06 PROCEDURE — 80048 BASIC METABOLIC PNL TOTAL CA: CPT | Performed by: INTERNAL MEDICINE

## 2020-11-06 PROCEDURE — 82962 GLUCOSE BLOOD TEST: CPT

## 2020-11-06 PROCEDURE — 94799 UNLISTED PULMONARY SVC/PX: CPT

## 2020-11-06 PROCEDURE — 25010000002 HEPARIN (PORCINE) PER 1000 UNITS: Performed by: HOSPITALIST

## 2020-11-06 RX ADMIN — SODIUM CHLORIDE, PRESERVATIVE FREE 10 ML: 5 INJECTION INTRAVENOUS at 08:39

## 2020-11-06 RX ADMIN — SODIUM CHLORIDE, PRESERVATIVE FREE 10 ML: 5 INJECTION INTRAVENOUS at 08:38

## 2020-11-06 RX ADMIN — SODIUM CHLORIDE 100 ML/HR: 9 INJECTION, SOLUTION INTRAVENOUS at 01:27

## 2020-11-06 RX ADMIN — METOPROLOL SUCCINATE 50 MG: 50 TABLET, EXTENDED RELEASE ORAL at 08:38

## 2020-11-06 RX ADMIN — BUDESONIDE AND FORMOTEROL FUMARATE DIHYDRATE 2 PUFF: 160; 4.5 AEROSOL RESPIRATORY (INHALATION) at 07:52

## 2020-11-06 RX ADMIN — GABAPENTIN 600 MG: 300 CAPSULE ORAL at 05:48

## 2020-11-06 RX ADMIN — VENLAFAXINE HYDROCHLORIDE 37.5 MG: 75 TABLET ORAL at 08:38

## 2020-11-06 RX ADMIN — TIOTROPIUM BROMIDE INHALATION SPRAY 2 PUFF: 3.12 SPRAY, METERED RESPIRATORY (INHALATION) at 07:52

## 2020-11-06 RX ADMIN — DILTIAZEM HYDROCHLORIDE 240 MG: 240 CAPSULE, COATED, EXTENDED RELEASE ORAL at 08:38

## 2020-11-06 RX ADMIN — HEPARIN SODIUM 5000 UNITS: 5000 INJECTION INTRAVENOUS; SUBCUTANEOUS at 05:47

## 2020-11-06 RX ADMIN — BUDESONIDE AND FORMOTEROL FUMARATE DIHYDRATE 2 PUFF: 160; 4.5 AEROSOL RESPIRATORY (INHALATION) at 07:51

## 2020-11-06 NOTE — PROGRESS NOTES
Case Management Discharge Note      Final Note: DC'd home with family.  E-mail sent to Monae Nelson. Nereyda Ybarra RN         Selected Continued Care - Discharged on 11/6/2020 Admission date: 11/3/2020 - Discharge disposition: Home or Self Care    Destination    No services have been selected for the patient.              Durable Medical Equipment    No services have been selected for the patient.              Dialysis/Infusion    No services have been selected for the patient.              Home Medical Care    No services have been selected for the patient.              Therapy    No services have been selected for the patient.              Community Resources    No services have been selected for the patient.                  Transportation Services  Private: Car    Final Discharge Disposition Code: 01 - home or self-care

## 2020-11-06 NOTE — PLAN OF CARE
Goal Outcome Evaluation:  Plan of Care Reviewed With: patient  Progress: improving        Patient talkative and slightly forgetful, alert and oriented. Remains on room air with no shortness of breath.

## 2020-11-06 NOTE — PROGRESS NOTES
LOS: 3 days   Patient Care Team:  Provider, No Known as PCP - General    Chief Complaint: Parker secondary to Rhabdo    Subjective     History of Present Illness    Subjective    History taken from: patient chart    Objective     Vital Signs  Temp:  [97.1 °F (36.2 °C)-98 °F (36.7 °C)] 97.6 °F (36.4 °C)  Heart Rate:  [60-81] 79  Resp:  [18-20] 20  BP: (103-130)/(60-77) 130/77    Objective  General Appearance:  In no acute distress.    HEENT: Normal HEENT exam.     Extremities: .  There is no deformity, effusion or dependent edema.    Neurological: Patient is alert and oriented to person, place and time.    Skin:  Warm and dry.  No rash.       Results Review:     I reviewed the patient's new clinical results.    Medication Review:   Scheduled Meds:atorvastatin, 20 mg, Oral, Daily  budesonide-formoterol, 2 puff, Inhalation, BID - RT  dilTIAZem CD, 240 mg, Oral, Q24H  gabapentin, 600 mg, Oral, Q8H  heparin (porcine), 5,000 Units, Subcutaneous, Q8H  insulin lispro, 0-7 Units, Subcutaneous, TID AC  metoprolol succinate XL, 50 mg, Oral, Q24H  sodium chloride, 10 mL, Intravenous, Q12H  sodium chloride, 10 mL, Intravenous, Q12H  tiotropium bromide monohydrate, 2 puff, Inhalation, Daily - RT  venlafaxine, 37.5 mg, Oral, BID With Meals      Continuous Infusions:sodium chloride, 100 mL/hr, Last Rate: 100 mL/hr (11/06/20 0838)      PRN Meds:.•  acetaminophen **OR** acetaminophen **OR** acetaminophen  •  albuterol sulfate HFA  •  dextrose  •  dextrose  •  glucagon (human recombinant)  •  ondansetron **OR** ondansetron  •  [COMPLETED] Insert peripheral IV **AND** sodium chloride  •  sodium chloride  •  sodium chloride    Assessment/Plan       PARKER (acute kidney injury) (CMS/[facility])    A-fib (CMS/[facility])    COPD (chronic obstructive pulmonary disease) (CMS/[facility])    Hypertension    Diabetes mellitus (CMS/[facility])    Renal disorder    Hypoxia    UTI (urinary tract infection)    Elevated troponin    Pneumonia due to COVID-19 virus    Acute  respiratory failure with hypoxia (CMS/[facility])    Rhabdomyolysis      Assessment & Plan  New, acute kidney injury, likely prerenal, hypoxemia and some mild hypotension likely contributing factors  CKD stage II, baseline creatinine around 1.0  Atrial fibrillation  Acute hypoxemic respiratory failure  COVID-19 pneumonia  Hypoxemia  Urinary tract infection  Anemia  Elevated troponin, likely in part due to his CKD  Elevated CK, rule out rhabdo  Lactic acidosis hypoxemia hypotension    Patient seen and examined. Labs reviewed. Renal function much improved. Ok for d/c. May f/u with pcp    Neyda Mendoza MD  11/06/20  08:17 EST

## 2020-11-06 NOTE — PROGRESS NOTES
Continued Stay Note  [facility]     Patient Name: Mulugeta Falcon  MRN: [id number]  Today's Date: 11/6/2020    Admit Date: 11/3/2020    Discharge Plan     Row Name 11/06/20 1618       Plan    Plan Comments  Per nursing, patient's sister to transport him home. Nereyda Ybarra RN        Discharge Codes    No documentation.       Expected Discharge Date and Time     Expected Discharge Date Expected Discharge Time    Nov 6, 2020             Nereyda Ybarra RN

## 2020-11-06 NOTE — PLAN OF CARE
Goal Outcome Evaluation:  Plan of Care Reviewed With: patient  Progress: improving  Outcome Summary: VSS, pt denies pain, pt slept well during shift, pt in agreement c/ DC plan

## 2020-11-06 NOTE — PROGRESS NOTES
Continued Stay Note  [facility]     Patient Name: Mulugeta Falcon  MRN: [id number]  Today's Date: 11/6/2020    Admit Date: 11/3/2020    Discharge Plan     Row Name 11/06/20 1058       Plan    Plan Comments  Spoke with patient via telephone due to pandemic isolation precautions.  Patient confirms that at [place], he will return home with his sister, Zoe Corey,  and brother in law, Derick Corey.  Discussed HH and patient asked that I call his sister to discuss.  Message left.  Awaiting call back. Nereyda Ybarra RN        Discharge Codes    No documentation.             Nereyda Ybarra RN

## 2020-11-07 ENCOUNTER — READMISSION MANAGEMENT (OUTPATIENT)
Dept: CALL CENTER | Facility: HOSPITAL | Age: 65
End: 2020-11-07

## 2020-11-07 NOTE — OUTREACH NOTE
Prep Survey      Responses   [facility] patient discharged from?  [place]   Is LACE score < 7 ?  No   Eligibility  Readm Mgmt   Discharge diagnosis  acute kidney injury)   Does the patient have one of the following disease processes/diagnoses(primary or secondary)?  COVID-19   Does the patient have Home health ordered?  No   Is there a DME ordered?  No   Prep survey completed?  Yes          Mahi Weaver RN

## 2020-11-07 NOTE — OUTREACH NOTE
COVID-19 Week 1 Survey      Responses   [facility] patient discharged from?  [place]   Does the patient have one of the following disease processes/diagnoses(primary or secondary)?  COVID-19   COVID-19 underlying condition?  None   Call Number  Call 1   Week 1 Call successful?  No   Discharge diagnosis  acute kidney injury          Dafne Pantoja RN

## 2020-11-08 ENCOUNTER — READMISSION MANAGEMENT (OUTPATIENT)
Dept: CALL CENTER | Facility: HOSPITAL | Age: 65
End: 2020-11-08

## 2020-11-08 LAB
BACTERIA SPEC AEROBE CULT: NORMAL
BACTERIA SPEC AEROBE CULT: NORMAL

## 2020-11-08 NOTE — OUTREACH NOTE
COVID-19 Week 1 Survey      Responses   [facility] patient discharged from?  [place]   Does the patient have one of the following disease processes/diagnoses(primary or secondary)?  COVID-19   COVID-19 underlying condition?  COPD   Call Number  Call 2   Week 1 Call successful?  No   Discharge diagnosis  acute kidney injury          Rebekah Caceres RN

## 2020-11-09 ENCOUNTER — READMISSION MANAGEMENT (OUTPATIENT)
Dept: CALL CENTER | Facility: HOSPITAL | Age: 65
End: 2020-11-09

## 2020-11-09 NOTE — OUTREACH NOTE
COVID-19 Week 1 Survey      Responses   [facility] patient discharged from?  [place]   Does the patient have one of the following disease processes/diagnoses(primary or secondary)?  COVID-19   COVID-19 underlying condition?  COPD   Call Number  Call 3   Week 1 Call successful?  No   Discharge diagnosis  acute kidney injury          Cassandra Casiano RN

## 2020-11-12 ENCOUNTER — READMISSION MANAGEMENT (OUTPATIENT)
Dept: CALL CENTER | Facility: HOSPITAL | Age: 65
End: 2020-11-12

## 2020-11-12 NOTE — OUTREACH NOTE
COVID-19 Week 1 Survey      Responses   [facility] patient discharged from?  [place]   Does the patient have one of the following disease processes/diagnoses(primary or secondary)?  COVID-19   COVID-19 underlying condition?  COPD   Call Number  Call 4   Week 1 Call successful?  Yes   Call start time  1027   Call end time  1032   Meds reviewed with patient/caregiver?  Yes   Is the patient having any side effects they believe may be caused by any medication additions or changes?  No   Does the patient have all medications ordered at discharge?  Yes   Is the patient taking all medications as directed (includes completed medication regime)?  Yes   Does the patient have a primary care provider?   Yes   Comments regarding PCP  PATIENT STATES HE DOES HAVE A PCP AND HE HAS AN APPOINTMENT MONDAY.    Does the patient have an appointment with their PCP or specialist within 7 days of discharge?  Greater than 7 days   What is preventing the patient from scheduling follow up appointments within 7 days of discharge?  Earlier appointment not available   Nursing Interventions  Verified appointment date/time/provider   Has the patient kept scheduled appointments due by today?  N/A   Has home health visited the patient within 72 hours of discharge?  N/A   Did the patient receive a copy of their discharge instructions?  Yes   Did the patient receive a copy of COVID-19 specific instructions?  Yes   Nursing interventions  Reviewed instructions with patient   What is the patient's perception of their health status since discharge?  Improving   Does the patient have any of the following symptoms?  Shortness of breath [PATIENT REPORTS MILD SOA AND EXTREME FATIGUE]   Nursing Interventions  Nurse provided patient education   Pulse Ox monitoring  None   Is the patient/caregiver able to teach back steps to recovery at home?  Set small, achievable goals for return to baseline health, Rest and rebuild strength, gradually increase  activity, Make a list of questions for provider's appointment   If the patient is a current smoker, are they able to teach back resources for cessation?  Not a smoker   Is the patient/caregiver able to teach back the hierarchy of who to call/visit for symptoms/problems? PCP, Specialist, Home health nurse, Urgent Care, ED, 911  Yes   Is the patient able to teach back COPD zones?  Yes   Nursing interventions  Education provided on various zones   Patient reports what zone on this call?  Green Zone   Green Zone  Reports doing well, Breathing without shortness of breath, Usual activity and exercise level, Usual amount of phlegm/mucus without difficulty coughing up, Sleeping well, Appetite is good [PATIENT REPORTS ONLY MILD SOA]   Green Zone interventions:  Take daily medications, Avoid indoor/outdoor triggers   COVID-19 call completed?  Yes          Chayo Amor LPN

## 2020-11-15 ENCOUNTER — READMISSION MANAGEMENT (OUTPATIENT)
Dept: CALL CENTER | Facility: HOSPITAL | Age: 65
End: 2020-11-15

## 2020-11-15 NOTE — OUTREACH NOTE
COVID-19 Week 2 Survey      Responses   [facility] patient discharged from?  [place]   Does the patient have one of the following disease processes/diagnoses(primary or secondary)?  COVID-19   COVID-19 underlying condition?  COPD   Call Number  Call 1   COVID-19 Week 2: Call 1 attempt successful?  No   Discharge diagnosis  acute kidney injury          Myron Dixon RN

## 2020-11-18 ENCOUNTER — READMISSION MANAGEMENT (OUTPATIENT)
Dept: CALL CENTER | Facility: HOSPITAL | Age: 65
End: 2020-11-18

## 2020-11-18 NOTE — OUTREACH NOTE
COVID-19 Week 2 Survey      Responses   [facility] patient discharged from?  [place]   Does the patient have one of the following disease processes/diagnoses(primary or secondary)?  COVID-19   COVID-19 underlying condition?  COPD   Call Number  Call 2   COVID-19 Week 2: Call 1 attempt successful?  No   Discharge diagnosis  acute kidney injury          Dafne Pantoja RN

## 2020-11-25 ENCOUNTER — READMISSION MANAGEMENT (OUTPATIENT)
Dept: CALL CENTER | Facility: HOSPITAL | Age: 65
End: 2020-11-25

## 2020-11-25 NOTE — OUTREACH NOTE
COVID-19 Week 3 Survey      Responses   [facility] [place] patient discharged from?  [place]   Does the patient have one of the following disease processes/diagnoses(primary or secondary)?  COVID-19   COVID-19 underlying condition?  COPD   Call Number  Call 1   COVID-19 Week 3: Call 1 attempt successful?  No   Discharge diagnosis  acute kidney injury          Maribel Celestin RN

## 2020-12-02 ENCOUNTER — READMISSION MANAGEMENT (OUTPATIENT)
Dept: CALL CENTER | Facility: HOSPITAL | Age: 65
End: 2020-12-02

## 2020-12-02 NOTE — OUTREACH NOTE
COVID-19 Week 4 Survey      Responses   [facility] patient discharged from?  [facility]   Does the patient have one of the following disease processes/diagnoses(primary or secondary)?  COVID-19   COVID-19 underlying condition?  COPD   Call Number  Call 1   COVID-19 Week 4: Call 1 attempt successful?  No   Discharge diagnosis  acute kidney injury          Dafne Pantoja RN

## 2020-12-19 ENCOUNTER — APPOINTMENT (OUTPATIENT)
Dept: GENERAL RADIOLOGY | Facility: HOSPITAL | Age: 65
End: 2020-12-19

## 2020-12-19 ENCOUNTER — HOSPITAL ENCOUNTER (EMERGENCY)
Facility: HOSPITAL | Age: 65
Discharge: HOME | End: 2020-12-19
Attending: EMERGENCY MEDICINE | Admitting: EMERGENCY MEDICINE

## 2020-12-19 VITALS
OXYGEN SATURATION: 100 % | SYSTOLIC BLOOD PRESSURE: 128 MMHG | BODY MASS INDEX: 27.21 KG/M2 | TEMPERATURE: 98.2 F | HEART RATE: 86 BPM | WEIGHT: 169.31 LBS | DIASTOLIC BLOOD PRESSURE: 72 MMHG | HEIGHT: 66 IN | RESPIRATION RATE: 17 BRPM

## 2020-12-19 DIAGNOSIS — S30.0XXA SACRAL CONTUSION, INITIAL ENCOUNTER: Primary | ICD-10-CM

## 2020-12-19 DIAGNOSIS — S50.01XA CONTUSION OF RIGHT ELBOW, INITIAL ENCOUNTER: ICD-10-CM

## 2020-12-19 PROCEDURE — 99283 EMERGENCY DEPT VISIT LOW MDM: CPT

## 2020-12-19 PROCEDURE — 73070 X-RAY EXAM OF ELBOW: CPT

## 2020-12-19 PROCEDURE — 72220 X-RAY EXAM SACRUM TAILBONE: CPT

## 2020-12-19 RX ORDER — TRAMADOL HYDROCHLORIDE 50 MG/1
50 TABLET ORAL EVERY 6 HOURS PRN
Qty: 12 TABLET | Refills: 0 | Status: SHIPPED | OUTPATIENT
Start: 2020-12-19

## 2020-12-20 NOTE — ED PROVIDER NOTES
Subjective   65-year-old male complaining of pain to his elbow and buttocks area after being involved in a fall this week.  The patient reports that he has had no rectal bleeding.  Reports no radicular pain down his legs.  He reports no ulnar neurapraxia.  He reports no neck or spinal discomfort.  He states he has pain all over from degenerative joint disease          Review of Systems   Gastrointestinal: Negative for abdominal distention, abdominal pain, anal bleeding and nausea.   Genitourinary: Negative for hematuria, scrotal swelling and testicular pain.   Musculoskeletal: Positive for arthralgias, back pain, neck pain and neck stiffness.   Skin: Negative for wound.       Past Medical History:   Diagnosis Date   • A-fib (CMS/[facility])    • COPD (chronic obstructive pulmonary disease) (CMS/[facility])    • Diabetes mellitus (CMS/[facility])     type 2   • Emphysema lung (CMS/[facility])    • Herniated disc, cervical    • Hypertension    • Hypoxia [id number]   • Renal disorder        No Known Allergies    No past surgical history on file.    No family history on file.    Social History     Socioeconomic History   • Marital status:      Spouse name: Not on file   • Number of children: Not on file   • Years of education: Not on file   • Highest education level: Not on file   Tobacco Use   • Smoking status: Former Smoker   • Smokeless tobacco: Never Used   Substance and Sexual Activity   • Alcohol use: Not Currently     Comment: drank for 20 years, sober now    • Drug use: Never   • Sexual activity: Defer           Objective   Physical Exam  Alert Nasir Coma Scale 15   HEENT: Pupils equal and reactive to light. Conjunctivae are not injected. normal tympanic membranes. Oropharynx and nares are normal.   Neck: Supple. Midline trachea. No JVD. No goiter.   Chest: Clear and equal breath sounds bilaterally regular rate and rhythm without murmur or rub.   Abdomen: Positive bowel sounds nontender nondistended. No rebound or peritoneal signs.  No CVA tenderness.  There is tenderness noted in the sacral area.  There is point tender in midline.  There is no pain with pressure along the pelvic ring.  Extremities no clubbing cyanosis or edema motor sensory exam is normal the full range of motion is intact patient has got contusion swelling noted diffusely to the lateral aspect of the elbow.  There is no evidence of effusion.  There is no olecranon bursitis.  Patient does not have increasing pain with pronation supination of the forearm   skin: Warm and dry, no rashes or petechia.   Lymphatic: No regional lymphadenopathy. No calf pain, swelling or Marty's sign    Procedures           ED Course  ED Course as of Dec 19 2222   Sat Dec 19, 2020   2219 I examined the patient using the appropriate personal protective equipment.          [TH]      ED Course User Index  [TH] Sekou Huggins MD                                 ER evaluation of x-ray showed no acute fracture        MDM  Number of Diagnoses or Management Options     Amount and/or Complexity of Data Reviewed  Tests in the radiology section of CPT®: reviewed  Independent visualization of images, tracings, or specimens: yes    Risk of Complications, Morbidity, and/or Mortality  Presenting problems: high  Diagnostic procedures: high  General comments: The patient be given a prescription for Toradol.  Inspect was reviewed by pharmacy.  He was placed in Ace wrap and also was dispensed a ring pillow.  He is encouraged to follow-up with his primary care provider        Final diagnoses:   Sacral contusion, initial encounter   Contusion of right elbow, initial encounter            Sekou Huggins MD  12/19/20 2222

## 2020-12-20 NOTE — DISCHARGE INSTRUCTIONS
Rest, no prolonged walking or standing for the next 3 days  Use Ace wrap on her elbow for the next 3 days then discard  Medication as directed  Follow-up with your primary care provider

## 2020-12-20 NOTE — ED NOTES
"Pt states that he fell at [email or web address] yesterday and hurt his RIGHT elbow and his tailbone. He states that at first he was \"shook up\" about it and went home but today it was really hurting him.    Pt is not on blood thinner, no LOC and no head injury     Kaila Whitfield, LPN  12/19/20 2030    "

## 2021-03-22 ENCOUNTER — BULK ORDERING (OUTPATIENT)
Dept: CASE MANAGEMENT | Facility: OTHER | Age: 66
End: 2021-03-22

## 2021-03-22 DIAGNOSIS — Z23 IMMUNIZATION DUE: ICD-10-CM

## 2024-02-20 NOTE — DISCHARGE SUMMARY
PHYSICIAN DISCHARGE SUMMARY                                                                        [facility]    Patient Identification:  Name: Mulugeta Falcon  Age: 65 y.o.  Sex: male  :  1955  MRN: [id number]  Primary Care Physician: Provider, No Known    Admit date: 11/3/2020  Discharge date and time:2020  Discharged Condition: good    Discharge Diagnoses:  Active Hospital Problems    Diagnosis  POA    **KRISTIE (acute kidney injury) (CMS/[facility]) [N17.9]  Yes    Rhabdomyolysis [M62.82]  Unknown    A-fib (CMS/[facility]) [I48.91]  Unknown    COPD (chronic obstructive pulmonary disease) (CMS/[facility]) [J44.9]  Unknown    Hypertension [I10]  Unknown    Diabetes mellitus (CMS/[facility]) [E11.9]  Unknown    Renal disorder [N28.9]  Unknown    Hypoxia [R09.02]  Unknown    UTI (urinary tract infection) [N39.0]  Unknown    Elevated troponin [R77.8]  Unknown    Pneumonia due to COVID-19 virus [U07.1, J12.89]  Unknown    Acute respiratory failure with hypoxia (CMS/[facility]) [J96.01]  Unknown      Resolved Hospital Problems   No resolved problems to display.   Patient had acute on chronic hypoxic respiratory failure on admission which resolved and patient has chronic respiratory failure and is on oxygen at home.  Traumatic rhabdomyolysis due to injury and immobilization  Type II non-ST elevation MI present on admission  Sepsis secondary to COVID-19 with acute kidney injury present on admission which was treated and resolved  PMHX:   Past Medical History:   Diagnosis Date    A-fib (CMS/HCC)     COPD (chronic obstructive pulmonary disease) (CMS/[facility])     Diabetes mellitus (CMS/[facility])     type 2    Emphysema lung (CMS/[facility])     Herniated disc, cervical     Hypertension     Hypoxia [id number]    Renal disorder      PSHX: History reviewed. No pertinent surgical history.    Hospital Course: Mulugeta Falcon  is a 65-year-old white male with history of A. fib, COPD, diabetes  type 2, degenerative disc disease, hypertension and history of some sort of renal disorder who is admitted with history of being found in his car sleeping in having altered mental status and EMS noted he was hypoxic with O2 sats in the 70s and blood sugar of 55.  The patient was brought to the ER for further evaluation also noted to be in renal failure with creatinine of 4.  He has been a little short of air and coughing some.  Patient also had elevated troponin and elevated CPK level.  The patient is confused is difficult to get much more useful information from him.  The patient was admitted for further treatment.          The patient was admitted to the hospital and seen by infectious disease, pulmonary, cardiology and nephrology.  Patient was given some IV fluids for hydration and was not felt to be terribly symptomatic with COVID-19.  He initially got some Decadron which was stopped.  ID did not feel he needed any antibiotics.  His creatinine was coming down his CPK level was coming down.  His creatinine was 1.5 at discharge and he felt well enough to go home.  Follow-up with primary care in 1 week and get a BMP.  He will probably need to isolate for a few more days at least to be symptom-free for 72 hours.    Consults:      Consults       Date and Time Order Name Status Description    11/3/2020 1605 Inpatient Infectious Diseases Consult Completed     11/3/2020 1605 Inpatient Pulmonology Consult Completed     11/3/2020 1604 Inpatient Cardiology Consult Completed     11/3/2020 1553 Inpatient Nephrology Consult Completed     11/3/2020 1043 [facility] (on-call MD unless specified) Details Completed           Results from last 7 days   Lab Units 11/06/20  0731   WBC 10*3/mm3 5.57   HEMOGLOBIN g/dL 8.0*   HEMATOCRIT % 24.6*   PLATELETS 10*3/mm3 218     Results from last 7 days   Lab Units 11/06/20  0731   SODIUM mmol/L 145   POTASSIUM mmol/L 3.5   CHLORIDE mmol/L 110*   CO2 mmol/L 26.3   BUN mg/dL 27*   CREATININE mg/dL  1.55*   GLUCOSE mg/dL 77   CALCIUM mg/dL 8.1*     Significant Diagnostic Studies:   WBC   Date Value Ref Range Status   11/06/2020 5.57 3.40 - 10.80 10*3/mm3 Final     Hemoglobin   Date Value Ref Range Status   11/06/2020 8.0 (L) 13.0 - 17.7 g/dL Final     Hematocrit   Date Value Ref Range Status   11/06/2020 24.6 (L) 37.5 - 51.0 % Final     Platelets   Date Value Ref Range Status   11/06/2020 218 140 - 450 10*3/mm3 Final     Sodium   Date Value Ref Range Status   11/06/2020 145 136 - 145 mmol/L Final     Potassium   Date Value Ref Range Status   11/06/2020 3.5 3.5 - 5.2 mmol/L Final     Chloride   Date Value Ref Range Status   11/06/2020 110 (H) 98 - 107 mmol/L Final     CO2   Date Value Ref Range Status   11/06/2020 26.3 22.0 - 29.0 mmol/L Final     BUN   Date Value Ref Range Status   11/06/2020 27 (H) 8 - 23 mg/dL Final     Creatinine   Date Value Ref Range Status   11/06/2020 1.55 (H) 0.76 - 1.27 mg/dL Final     Glucose   Date Value Ref Range Status   11/06/2020 77 65 - 99 mg/dL Final     Calcium   Date Value Ref Range Status   11/06/2020 8.1 (L) 8.6 - 10.5 mg/dL Final     AST (SGOT)   Date Value Ref Range Status   11/05/2020 29 1 - 40 U/L Final     ALT (SGPT)   Date Value Ref Range Status   11/05/2020 18 1 - 41 U/L Final     Alkaline Phosphatase   Date Value Ref Range Status   11/05/2020 60 39 - 117 U/L Final     No results found for: APTT, INR  Color, UA   Date Value Ref Range Status   11/04/2020 Yellow Yellow, Straw Final     Appearance, UA   Date Value Ref Range Status   11/04/2020 Clear Clear Final     pH, UA   Date Value Ref Range Status   11/04/2020 <=5.0 5.0 - 8.0 Final     Glucose, UA   Date Value Ref Range Status   11/04/2020 Negative Negative Final     Ketones, UA   Date Value Ref Range Status   11/04/2020 Negative Negative Final     Blood, UA   Date Value Ref Range Status   11/04/2020 Moderate (2+) (A) Negative Final     Leuk Esterase, UA   Date Value Ref Range Status   11/04/2020 Negative Negative  Final     Bilirubin, UA   Date Value Ref Range Status   11/04/2020 Negative Negative Final     Urobilinogen, UA   Date Value Ref Range Status   11/04/2020 0.2 E.U./dL 0.2 - 1.0 E.U./dL Final     RBC, UA   Date Value Ref Range Status   11/04/2020 0-2 None Seen, 0-2 /HPF Final     WBC, UA   Date Value Ref Range Status   11/04/2020 None Seen None Seen, 0-2 /HPF Final     Bacteria, UA   Date Value Ref Range Status   11/04/2020 Trace (A) None Seen /HPF Final     Troponin T   Date Value Ref Range Status   11/03/2020 0.364 (C) 0.000 - 0.030 ng/mL Final     No components found for: HGBA1C;2  No components found for: TSH;2  Imaging Results (All)       Procedure Component Value Units Date/Time    CT Abdomen Pelvis Without Contrast [[id number]] Collected: 11/03/20 1153     Updated: 11/04/20 1027    Narrative:      CT ABDOMEN AND PELVIS WITHOUT CONTRAST     HISTORY: Fever, acute renal failure. To assess for calculus.     TECHNIQUE: Axial CT images of the abdomen and pelvis were obtained  without administration of intravenous contrast. The patient was not  given oral contrast. Coronal and sagittal reformats were obtained.     COMPARISON: None     FINDINGS: Bilateral adrenal glands are normal. Both kidneys are normal  in size and attenuation. Punctate 3 mm calculus is seen within the lower  pole of the right kidney. No hydronephrosis. The right ureter  demonstrates normal caliber. 2 punctate nonobstructing calculi, the  larger measuring 4 mm also seen at the lower pole of the left kidney. No  hydronephrosis. The left ureter demonstrates normal caliber. The urinary  bladder is moderately distended and otherwise unremarkable. Mild diffuse  hepatic steatosis is seen. The gallbladder is distended. The pancreas is  normal without ductal dilatation. The spleen is normal. The small and  large bowel loops demonstrate normal caliber. The appendix is normal. No  pathological retroperitoneal lymphadenopathy. Moderate  Other reason... calcified  atherosclerotic plaque is seen within the abdominal aorta and its  branches. Small fat-containing umbilical hernia is present. Discoid  atelectasis is seen within the right lung base. Degenerative disc  disease is seen in the spine with vacuum disc phenomenon at multiple  levels.       Impression:      Bilateral nonobstructing punctate nephroliths.     Radiation dose reduction techniques were utilized, including automated  exposure control and exposure modulation based on body size.     This report was finalized on 11/4/2020 10:24 AM by Dr. Rafia Peacock M.D.       XR Chest 1 View [[id number]] Collected: 11/03/20 0811     Updated: 11/03/20 0815    Narrative:      XR CHEST 1 VW-     Clinical: Fever and shortness of breath     COMPARISON: None     FINDINGS: Cardiac size upper limits of normal to mildly enlarged. No  edema or effusion is demonstrated. Less than optimal inspiratory effort,  minimal patchy infiltrate or atelectasis demonstrated at the right lung  base. Favor atelectasis. No gross consolidation seen. There are  monitoring leads superimposing the chest, the remainder is unremarkable.     This report was finalized on 11/3/2020 8:12 AM by Dr. Jonathan Wong M.D.             Lab Results (last 7 days)       Procedure Component Value Units Date/Time    POC Glucose Once [[id number]]  (Normal) Collected: 11/06/20 1215    Specimen: Blood Updated: 11/06/20 1221     Glucose 101 mg/dL     Basic Metabolic Panel [[id number]]  (Abnormal) Collected: 11/06/20 0731    Specimen: Blood Updated: 11/06/20 0859     Glucose 77 mg/dL      BUN 27 mg/dL      Creatinine 1.55 mg/dL      Sodium 145 mmol/L      Potassium 3.5 mmol/L      Chloride 110 mmol/L      CO2 26.3 mmol/L      Calcium 8.1 mg/dL      eGFR Non African Amer 45 mL/min/1.73      BUN/Creatinine Ratio 17.4     Anion Gap 8.7 mmol/L     Narrative:      GFR Normal >60  Chronic Kidney Disease <60  Kidney Failure <15      Blood Culture - Blood, Arm, Left  [[id number]] Collected: 11/03/20 0836    Specimen: Blood from Arm, Left Updated: 11/06/20 0845     Blood Culture No growth at 3 days    Blood Culture - Blood, Arm, Left [[id number]] Collected: 11/03/20 0825    Specimen: Blood from Arm, Left Updated: 11/06/20 0845     Blood Culture No growth at 3 days    CBC (No Diff) [[id number]]  (Abnormal) Collected: 11/06/20 0731    Specimen: Blood Updated: 11/06/20 0818     WBC 5.57 10*3/mm3      RBC 2.98 10*6/mm3      Hemoglobin 8.0 g/dL      Hematocrit 24.6 %      MCV 82.6 fL      MCH 26.8 pg      MCHC 32.5 g/dL      RDW 15.2 %      RDW-SD 46.1 fl      MPV 11.1 fL      Platelets 218 10*3/mm3     POC Glucose Once [[id number]]  (Normal) Collected: 11/06/20 0546    Specimen: Blood Updated: 11/06/20 0618     Glucose 95 mg/dL     POC Glucose Once [[id number]]  (Normal) Collected: 11/05/20 2040    Specimen: Blood Updated: 11/05/20 2041     Glucose 103 mg/dL     POC Glucose Once [[id number]]  (Abnormal) Collected: 11/05/20 1522    Specimen: Blood Updated: 11/05/20 1527     Glucose 138 mg/dL     POC Glucose Once [[id number]]  (Normal) Collected: 11/05/20 1159    Specimen: Blood Updated: 11/05/20 1210     Glucose 102 mg/dL     Ferritin [[id number]]  (Normal) Collected: 11/05/20 0658    Specimen: Blood Updated: 11/05/20 0846     Ferritin 77.30 ng/mL     Narrative:      Results may be falsely decreased if patient taking Biotin.      Comprehensive Metabolic Panel [[id number]]  (Abnormal) Collected: 11/05/20 0658    Specimen: Blood Updated: 11/05/20 0842     Glucose 99 mg/dL      BUN 36 mg/dL      Creatinine 2.14 mg/dL      Sodium 143 mmol/L      Potassium 4.0 mmol/L      Chloride 107 mmol/L      CO2 25.8 mmol/L      Calcium 8.3 mg/dL      Total Protein 5.9 g/dL      Albumin 3.80 g/dL      ALT (SGPT) 18 U/L      AST (SGOT) 29 U/L      Alkaline Phosphatase 60 U/L      Total Bilirubin <0.2 mg/dL      eGFR Non African Amer 31 mL/min/1.73      Globulin 2.1 gm/dL      A/G Ratio 1.8 g/dL       BUN/Creatinine Ratio 16.8     Anion Gap 10.2 mmol/L     Narrative:      GFR Normal >60  Chronic Kidney Disease <60  Kidney Failure <15      CK [[id number]]  (Abnormal) Collected: 11/05/20 0658    Specimen: Blood Updated: 11/05/20 0842     Creatine Kinase 707 U/L     C-reactive Protein [[id number]]  (Abnormal) Collected: 11/05/20 0658    Specimen: Blood Updated: 11/05/20 0842     C-Reactive Protein 6.26 mg/dL     CBC & Differential [[id number]]  (Abnormal) Collected: 11/05/20 0658    Specimen: Blood Updated: 11/05/20 0818    Narrative:      The following orders were created for panel order CBC & Differential.  Procedure                               Abnormality         Status                     ---------                               -----------         ------                     CBC Auto Differential[[id number]]        Abnormal            Final result                 Please view results for these tests on the individual orders.    CBC Auto Differential [[id number]]  (Abnormal) Collected: 11/05/20 0658    Specimen: Blood Updated: 11/05/20 0818     WBC 4.60 10*3/mm3      RBC 2.88 10*6/mm3      Hemoglobin 7.9 g/dL      Hematocrit 24.4 %      MCV 84.7 fL      MCH 27.4 pg      MCHC 32.4 g/dL      RDW 15.3 %      RDW-SD 47.7 fl      MPV 11.2 fL      Platelets 203 10*3/mm3      Neutrophil % 66.3 %      Lymphocyte % 23.5 %      Monocyte % 9.8 %      Eosinophil % 0.0 %      Basophil % 0.0 %      Immature Grans % 0.4 %      Neutrophils, Absolute 3.05 10*3/mm3      Lymphocytes, Absolute 1.08 10*3/mm3      Monocytes, Absolute 0.45 10*3/mm3      Eosinophils, Absolute 0.00 10*3/mm3      Basophils, Absolute 0.00 10*3/mm3      Immature Grans, Absolute 0.02 10*3/mm3      nRBC 0.0 /100 WBC     POC Glucose Once [[id number]]  (Normal) Collected: 11/05/20 0619    Specimen: Blood Updated: 11/05/20 0619     Glucose 112 mg/dL     POC Glucose Once [[id number]]  (Abnormal) Collected: 11/04/20 2021    Specimen: Blood Updated: 11/04/20 2021      Glucose 147 mg/dL     POC Glucose Once [[id number]]  (Normal) Collected: 11/04/20 1654    Specimen: Blood Updated: 11/04/20 1657     Glucose 127 mg/dL     POC Glucose Once [[id number]]  (Abnormal) Collected: 11/04/20 1121    Specimen: Blood Updated: 11/04/20 1128     Glucose 191 mg/dL     Urine Culture - Urine, Urine, Clean Catch [[id number]] Collected: 11/03/20 0813    Specimen: Urine, Clean Catch Updated: 11/04/20 1019     Urine Culture 50,000 CFU/mL Mixed Jake Isolated    Narrative:      Specimen contains mixed organisms of questionable pathogenicity which indicates contamination with commensal jake.  Further identification is unlikely to provide clinically useful information.  Suggest recollection.    Comprehensive Metabolic Panel [[id number]]  (Abnormal) Collected: 11/04/20 0732    Specimen: Blood Updated: 11/04/20 1007     Glucose 116 mg/dL      BUN 44 mg/dL      Creatinine 2.89 mg/dL      Sodium 144 mmol/L      Potassium 3.5 mmol/L      Chloride 104 mmol/L      CO2 24.8 mmol/L      Calcium 8.7 mg/dL      Total Protein 6.4 g/dL      Albumin 3.80 g/dL      ALT (SGPT) 17 U/L      AST (SGOT) 36 U/L      Alkaline Phosphatase 68 U/L      Total Bilirubin 0.2 mg/dL      eGFR Non African Amer 22 mL/min/1.73      Globulin 2.6 gm/dL      A/G Ratio 1.5 g/dL      BUN/Creatinine Ratio 15.2     Anion Gap 15.2 mmol/L     Narrative:      GFR Normal >60  Chronic Kidney Disease <60  Kidney Failure <15      CK [[id number]]  (Abnormal) Collected: 11/04/20 0732    Specimen: Blood Updated: 11/04/20 1004     Creatine Kinase 1,268 U/L     C-reactive Protein [[id number]]  (Abnormal) Collected: 11/04/20 0732    Specimen: Blood Updated: 11/04/20 1004     C-Reactive Protein 11.99 mg/dL     Lactate Dehydrogenase [[id number]]  (Abnormal) Collected: 11/04/20 0732    Specimen: Blood Updated: 11/04/20 1004      U/L     TSH [[id number]]  (Normal) Collected: 11/04/20 0732    Specimen: Blood Updated: 11/04/20 0955     TSH 0.330 uIU/mL      Ferritin [[id number]]  (Normal) Collected: 11/04/20 0732    Specimen: Blood Updated: 11/04/20 0955     Ferritin 77.20 ng/mL     Narrative:      Results may be falsely decreased if patient taking Biotin.      D-dimer, Quantitative [[id number]]  (Abnormal) Collected: 11/04/20 0732    Specimen: Blood Updated: 11/04/20 0927     D-Dimer, Quantitative 0.72 MCGFEU/mL     Narrative:      The Stago D-Dimer test used in conjunction with a clinical pretest probability (PTP) assessment model, has been approved by the FDA to rule out the presence of venous thromboembolism (VTE) in outpatients suspected of deep venous thrombosis (DVT) or pulmonary embolism (PE). The cut-off for negative predictive value is <0.50 MCGFEU/mL.    Fibrinogen [[id number]]  (Normal) Collected: 11/04/20 0732    Specimen: Blood Updated: 11/04/20 0927     Fibrinogen 425 mg/dL     CBC & Differential [[id number]]  (Abnormal) Collected: 11/04/20 0732    Specimen: Blood Updated: 11/04/20 0914    Narrative:      The following orders were created for panel order CBC & Differential.  Procedure                               Abnormality         Status                     ---------                               -----------         ------                     CBC Auto Differential[[id number]]        Abnormal            Final result                 Please view results for these tests on the individual orders.    CBC Auto Differential [[id number]]  (Abnormal) Collected: 11/04/20 0732    Specimen: Blood Updated: 11/04/20 0914     WBC 3.96 10*3/mm3      RBC 3.28 10*6/mm3      Hemoglobin 8.7 g/dL      Hematocrit 27.7 %      MCV 84.5 fL      MCH 26.5 pg      MCHC 31.4 g/dL      RDW 15.2 %      RDW-SD 46.6 fl      MPV 11.0 fL      Platelets 210 10*3/mm3      Neutrophil % 77.7 %      Lymphocyte % 16.7 %      Monocyte % 4.8 %      Eosinophil % 0.3 %      Basophil % 0.0 %      Immature Grans % 0.5 %      Neutrophils, Absolute 3.08 10*3/mm3      Lymphocytes, Absolute 0.66 10*3/mm3       Monocytes, Absolute 0.19 10*3/mm3      Eosinophils, Absolute 0.01 10*3/mm3      Basophils, Absolute 0.00 10*3/mm3      Immature Grans, Absolute 0.02 10*3/mm3      nRBC 0.0 /100 WBC     POC Glucose Once [[id number]]  (Normal) Collected: 11/04/20 0834    Specimen: Blood Updated: 11/04/20 0836     Glucose 120 mg/dL     Urinalysis, Microscopic Only - Urine, Clean Catch [[id number]]  (Abnormal) Collected: 11/04/20 0022    Specimen: Urine, Clean Catch Updated: 11/04/20 0135     RBC, UA 0-2 /HPF      WBC, UA None Seen /HPF      Bacteria, UA Trace /HPF      Squamous Epithelial Cells, UA None Seen /HPF      Hyaline Casts, UA None Seen /LPF      Granular Casts, UA 0-2 /LPF      Methodology Manual Light Microscopy    Urinalysis With Culture If Indicated - Urine, Clean Catch [[id number]]  (Abnormal) Collected: 11/04/20 0022    Specimen: Urine, Clean Catch Updated: 11/04/20 0120     Color, UA Yellow     Appearance, UA Clear     pH, UA <=5.0     Specific Gravity, UA 1.010     Glucose, UA Negative     Ketones, UA Negative     Bilirubin, UA Negative     Blood, UA Moderate (2+)     Protein, UA Trace     Leuk Esterase, UA Negative     Nitrite, UA Negative     Urobilinogen, UA 0.2 E.U./dL    Hemoglobin A1c [[id number]]  (Abnormal) Collected: 11/03/20 0825    Specimen: Blood Updated: 11/03/20 2048     Hemoglobin A1C 4.74 %     Narrative:      Hemoglobin A1C Ranges:    Increased Risk for Diabetes  5.7% to 6.4%  Diabetes                     >= 6.5%  Diabetic Goal                < 7.0%    POC Glucose Once [[id number]]  (Abnormal) Collected: 11/03/20 2030    Specimen: Blood Updated: 11/03/20 2031     Glucose 138 mg/dL     Troponin [[id number]]  (Abnormal) Collected: 11/03/20 1807    Specimen: Blood Updated: 11/03/20 1852     Troponin T 0.364 ng/mL     Narrative:      Troponin T Reference Range:  <= 0.03 ng/mL-   Negative for AMI  >0.03 ng/mL-     Abnormal for myocardial necrosis.  Clinicians would have to utilize clinical acumen, EKG, Troponin  "and serial changes to determine if it is an Acute Myocardial Infarction or myocardial injury due to an underlying chronic condition.       Results may be falsely decreased if patient taking Biotin.      D-dimer, Quantitative [[id number]]  (Abnormal) Collected: 11/03/20 1807    Specimen: Blood Updated: 11/03/20 1833     D-Dimer, Quantitative 0.75 MCGFEU/mL     Narrative:      The Stago D-Dimer test used in conjunction with a clinical pretest probability (PTP) assessment model, has been approved by the FDA to rule out the presence of venous thromboembolism (VTE) in outpatients suspected of deep venous thrombosis (DVT) or pulmonary embolism (PE). The cut-off for negative predictive value is <0.50 MCGFEU/mL.    Procalcitonin [[id number]]  (Abnormal) Collected: 11/03/20 0952    Specimen: Blood Updated: 11/03/20 1643     Procalcitonin 1.55 ng/mL     Narrative:      As a Marker for Sepsis (Non-Neonates):   1. <0.5 ng/mL represents a low risk of severe sepsis and/or septic shock.  1. >2 ng/mL represents a high risk of severe sepsis and/or septic shock.    As a Marker for Lower Respiratory Tract Infections that require antibiotic therapy:  PCT on Admission     Antibiotic Therapy             6-12 Hrs later  > 0.5                Strongly Recommended            >0.25 - <0.5         Recommended  0.1 - 0.25           Discouraged                   Remeasure/reassess PCT  <0.1                 Strongly Discouraged          Remeasure/reassess PCT      As 28 day mortality risk marker: \"Change in Procalcitonin Result\" (> 80 % or <=80 %) if Day 0 (or Day 1) and Day 4 values are available. Refer to http://www.[employer]-pct-calculator.com/   Change in PCT <=80 %   A decrease of PCT levels below or equal to 80 % defines a positive change in PCT test result representing a higher risk for 28-day all-cause mortality of patients diagnosed with severe sepsis or septic shock.  Change in PCT > 80 %   A decrease of PCT levels of more than 80 % defines " a negative change in PCT result representing a lower risk for 28-day all-cause mortality of patients diagnosed with severe sepsis or septic shock.                Results may be falsely decreased if patient taking Biotin.     Ferritin [[id number]]  (Normal) Collected: 11/03/20 0952    Specimen: Blood Updated: 11/03/20 1640     Ferritin 54.30 ng/mL     Narrative:      Results may be falsely decreased if patient taking Biotin.      Lactate Dehydrogenase [[id number]]  (Abnormal) Collected: 11/03/20 0952    Specimen: Blood Updated: 11/03/20 1638      U/L      Comment: Specimen hemolyzed.  Results may be affected.       Lactic Acid, Reflex [[id number]]  (Normal) Collected: 11/03/20 1409    Specimen: Blood Updated: 11/03/20 1503     Lactate 1.2 mmol/L     Lactic Acid, Reflex Timer (This will reflex a repeat order 3-3:15 hours after ordered.) [[id number]] Collected: 11/03/20 0825    Specimen: Blood Updated: 11/03/20 1200     Hold Tube Hold for add-ons.     Comment: Auto resulted.       Troponin [[id number]]  (Abnormal) Collected: 11/03/20 0952    Specimen: Blood Updated: 11/03/20 1134     Troponin T 0.291 ng/mL     Narrative:      Troponin T Reference Range:  <= 0.03 ng/mL-   Negative for AMI  >0.03 ng/mL-     Abnormal for myocardial necrosis.  Clinicians would have to utilize clinical acumen, EKG, Troponin and serial changes to determine if it is an Acute Myocardial Infarction or myocardial injury due to an underlying chronic condition.       Results may be falsely decreased if patient taking Biotin.      Urinalysis, Microscopic Only - Urine, Clean Catch [[id number]]  (Abnormal) Collected: 11/03/20 0813    Specimen: Urine, Clean Catch Updated: 11/03/20 1028     RBC, UA 0-2 /HPF      WBC, UA 6-12 /HPF      Bacteria, UA 1+ /HPF      Squamous Epithelial Cells, UA 0-2 /HPF      Hyaline Casts, UA None Seen /LPF      Amorphous Crystals, UA Small/1+ /HPF      Methodology Manual Light Microscopy    Respiratory Panel PCR  w/COVID-19(SARS-CoV-2) ANH/EFRAIN/GEO/PAD/COR/MAD/P[facility]HE[facility] In-House, NP Swab in UTM/VTM, 3-4 HR TAT - Swab, Nasopharynx [[id number]]  (Abnormal) Collected: 11/03/20 0825    Specimen: Swab from Nasopharynx Updated: 11/03/20 1009     ADENOVIRUS, PCR Not Detected     Coronavirus 229E Not Detected     Coronavirus HKU1 Not Detected     Coronavirus NL63 Not Detected     Coronavirus OC43 Not Detected     COVID19 Detected     Human Metapneumovirus Not Detected     Human Rhinovirus/Enterovirus Not Detected     Influenza A PCR Not Detected     Influenza A H1 Not Detected     Influenza A H1 2009 PCR Not Detected     Influenza A H3 Not Detected     Influenza B PCR Not Detected     Parainfluenza Virus 1 Not Detected     Parainfluenza Virus 2 Not Detected     Parainfluenza Virus 3 Not Detected     Parainfluenza Virus 4 Not Detected     RSV, PCR Not Detected     Bordetella pertussis pcr Not Detected     Bordetella parapertussis PCR Not Detected     Chlamydophila pneumoniae PCR Not Detected     Mycoplasma pneumo by PCR Not Detected    Narrative:      Fact sheet for providers: https://docs.Donews/wp-content/uploads/KJR4059-1937-FB1.1-EUA-Provider-Fact-Sheet-3.pdf    Fact sheet for patients: https://docs.Donews/wp-content/uploads/UMY4011-9630-GC4.1-EUA-Patient-Fact-Sheet-1.pdf    Urinalysis With Culture If Indicated - Urine, Clean Catch [[id number]]  (Abnormal) Collected: 11/03/20 0813    Specimen: Urine, Clean Catch Updated: 11/03/20 0959     Color, UA Yellow     Appearance, UA Cloudy     pH, UA <=5.0     Specific Gravity, UA 1.016     Glucose, UA Negative     Ketones, UA Trace     Bilirubin, UA Negative     Blood, UA Moderate (2+)     Protein, UA 30 mg/dL (1+)     Leuk Esterase, UA Negative     Nitrite, UA Negative     Urobilinogen, UA 0.2 E.U./dL    CK [[id number]]  (Abnormal) Collected: 11/03/20 0825    Specimen: Blood Updated: 11/03/20 0951     Creatine Kinase 1,588 U/L     Procalcitonin [[id number]]  (Abnormal) Collected:  "11/03/20 0825    Specimen: Blood Updated: 11/03/20 0924     Procalcitonin 1.65 ng/mL     Narrative:      As a Marker for Sepsis (Non-Neonates):   1. <0.5 ng/mL represents a low risk of severe sepsis and/or septic shock.  1. >2 ng/mL represents a high risk of severe sepsis and/or septic shock.    As a Marker for Lower Respiratory Tract Infections that require antibiotic therapy:  PCT on Admission     Antibiotic Therapy             6-12 Hrs later  > 0.5                Strongly Recommended            >0.25 - <0.5         Recommended  0.1 - 0.25           Discouraged                   Remeasure/reassess PCT  <0.1                 Strongly Discouraged          Remeasure/reassess PCT      As 28 day mortality risk marker: \"Change in Procalcitonin Result\" (> 80 % or <=80 %) if Day 0 (or Day 1) and Day 4 values are available. Refer to http://www.Adap.tvpct-calculator.Tiltap/   Change in PCT <=80 %   A decrease of PCT levels below or equal to 80 % defines a positive change in PCT test result representing a higher risk for 28-day all-cause mortality of patients diagnosed with severe sepsis or septic shock.  Change in PCT > 80 %   A decrease of PCT levels of more than 80 % defines a negative change in PCT result representing a lower risk for 28-day all-cause mortality of patients diagnosed with severe sepsis or septic shock.                Results may be falsely decreased if patient taking Biotin.     Troponin [[id number]]  (Abnormal) Collected: 11/03/20 0825    Specimen: Blood Updated: 11/03/20 0921     Troponin T 0.369 ng/mL     Narrative:      Troponin T Reference Range:  <= 0.03 ng/mL-   Negative for AMI  >0.03 ng/mL-     Abnormal for myocardial necrosis.  Clinicians would have to utilize clinical acumen, EKG, Troponin and serial changes to determine if it is an Acute Myocardial Infarction or myocardial injury due to an underlying chronic condition.       Results may be falsely decreased if patient taking Biotin.      " Comprehensive Metabolic Panel [[id number]]  (Abnormal) Collected: 11/03/20 0825    Specimen: Blood Updated: 11/03/20 0920     Glucose 102 mg/dL      BUN 48 mg/dL      Creatinine 4.03 mg/dL      Sodium 138 mmol/L      Potassium 3.6 mmol/L      Chloride 97 mmol/L      CO2 24.2 mmol/L      Calcium 9.0 mg/dL      Total Protein 7.6 g/dL      Albumin 4.80 g/dL      ALT (SGPT) 16 U/L      AST (SGOT) 37 U/L      Alkaline Phosphatase 83 U/L      Total Bilirubin 0.3 mg/dL      eGFR Non African Amer 15 mL/min/1.73      Globulin 2.8 gm/dL      A/G Ratio 1.7 g/dL      BUN/Creatinine Ratio 11.9     Anion Gap 16.8 mmol/L     Narrative:      GFR Normal >60  Chronic Kidney Disease <60  Kidney Failure <15      Acetaminophen Level [[id number]]  (Normal) Collected: 11/03/20 0825    Specimen: Blood Updated: 11/03/20 0920     Acetaminophen <5.0 mcg/mL     Ethanol [[id number]] Collected: 11/03/20 0825    Specimen: Blood Updated: 11/03/20 0920     Ethanol <10 mg/dL      Ethanol % <0.010 %     Salicylate Level [[id number]]  (Normal) Collected: 11/03/20 0825    Specimen: Blood Updated: 11/03/20 0920     Salicylate <0.3 mg/dL     Narrative:      Therapeutic range for Salicylates:  3.0 - 10.0 mg/dL for antipyretic/analgesic conditions  15.0 - 30.0 mg/dL for anti-inflammatory conditions    BNP [[id number]]  (Abnormal) Collected: 11/03/20 0825    Specimen: Blood Updated: 11/03/20 0917     proBNP 2,922.0 pg/mL     Narrative:      Among patients with dyspnea, NT-proBNP is highly sensitive for the detection of acute congestive heart failure. In addition NT-proBNP of <300 pg/ml effectively rules out acute congestive heart failure with 99% negative predictive value.    Results may be falsely decreased if patient taking Biotin.      Lactic Acid, Plasma [[id number]]  (Abnormal) Collected: 11/03/20 0825    Specimen: Blood Updated: 11/03/20 0859     Lactate 2.1 mmol/L     Urine Drug Screen - Urine, Clean Catch [[id number]]  (Abnormal) Collected: 11/03/20 0813     Specimen: Urine, Clean Catch Updated: 11/03/20 0856     Amphet/Methamphet, Screen Negative     Barbiturates Screen, Urine Negative     Benzodiazepine Screen, Urine Negative     Cocaine Screen, Urine Negative     Opiate Screen Negative     THC, Screen, Urine Negative     Methadone Screen, Urine Negative     Oxycodone Screen, Urine Positive    Narrative:      Negative Thresholds For Drugs Screened:     Amphetamines               500 ng/ml   Barbiturates               200 ng/ml   Benzodiazepines            100 ng/ml   Cocaine                    300 ng/ml   Methadone                  300 ng/ml   Opiates                    300 ng/ml   Oxycodone                  100 ng/ml   THC                        50 ng/ml    The Normal Value for all drugs tested is negative. This report includes final unconfirmed screening results to be used for medical treatment purposes only. Unconfirmed results must not be used for non-medical purposes such as employment or legal testing. Clinical consideration should be applied to any drug of abuse test, particulary when unconfirmed results are used.    CBC & Differential [[id number]]  (Abnormal) Collected: 11/03/20 0825    Specimen: Blood Updated: 11/03/20 0851    Narrative:      The following orders were created for panel order CBC & Differential.  Procedure                               Abnormality         Status                     ---------                               -----------         ------                     CBC Auto Differential[[id number]]        Abnormal            Final result                 Please view results for these tests on the individual orders.    CBC Auto Differential [[id number]]  (Abnormal) Collected: 11/03/20 0825    Specimen: Blood Updated: 11/03/20 0851     WBC 7.38 10*3/mm3      RBC 3.75 10*6/mm3      Hemoglobin 10.0 g/dL      Hematocrit 31.8 %      MCV 84.8 fL      MCH 26.7 pg      MCHC 31.4 g/dL      RDW 15.2 %      RDW-SD 47.2 fl      MPV 10.5 fL      Platelets  "254 10*3/mm3      Neutrophil % 83.2 %      Lymphocyte % 10.0 %      Monocyte % 6.0 %      Eosinophil % 0.3 %      Basophil % 0.1 %      Immature Grans % 0.4 %      Neutrophils, Absolute 6.14 10*3/mm3      Lymphocytes, Absolute 0.74 10*3/mm3      Monocytes, Absolute 0.44 10*3/mm3      Eosinophils, Absolute 0.02 10*3/mm3      Basophils, Absolute 0.01 10*3/mm3      Immature Grans, Absolute 0.03 10*3/mm3      nRBC 0.0 /100 WBC           /77 (BP Location: Right arm, Patient Position: Sitting)   Pulse 97   Temp 97.6 °F (36.4 °C) (Oral)   Resp 16   Ht 157.5 cm (62\")   Wt 85.1 kg (187 lb 11.2 oz)   SpO2 93%   BMI 34.33 kg/m²     Discharge Exam:  General Appearance:    Alert, cooperative, no distress                          Head:    Normocephalic, without obvious abnormality, atraumatic                          Eyes:                            Throat:   Lips, tongue, gums normal                          Neck:   Supple, symmetrical, trachea midline, no JVD                        Lungs:     Clear to auscultation bilaterally, respirations unlabored                Chest Wall:    No tenderness or deformity                        Heart:    Regular rate and rhythm, S1 and S2 normal, no murmur,no  Rub or gallop                  Abdomen:     Soft, non-tender, bowel sounds active, no masses, no organomegaly                  Extremities:   Extremities normal, atraumatic, no cyanosis or edema                             Skin:   Skin is warm and dry,  no rashes or palpable lesions                  Neurologic:   no focal deficits noted     Disposition:  Home    Patient Instructions:      Discharge Medications        Continue These Medications        Instructions Start Date   Cartia  MG 24 hr capsule  Generic drug: dilTIAZem CD   CARTIA  MG XR24H-CAP      clonazePAM 1 MG tablet  Commonly known as: KlonoPIN   CLONAZEPAM 1 MG TABS      cyclobenzaprine 10 MG tablet  Commonly known as: FLEXERIL   CYCLOBENZAPRINE HCL " 10 MG TABS      Dulera 100-5 MCG/ACT inhaler  Generic drug: mometasone-formoterol   DULERA 100-5 MCG/ACT AERO      gabapentin 600 MG tablet  Commonly known as: NEURONTIN   Every 6 Hours      metoprolol succinate XL 50 MG 24 hr tablet  Commonly known as: TOPROL-XL   Every 12 Hours      simvastatin 40 MG tablet  Commonly known as: ZOCOR   SIMVASTATIN 40 MG TABS      Symbicort 160-4.5 MCG/ACT inhaler  Generic drug: budesonide-formoterol   SYMBICORT 160-4.5 MCG/ACT AERO      venlafaxine 100 MG tablet  Commonly known as: EFFEXOR   VENLAFAXINE  MG TABS      Ventolin  (90 Base) MCG/ACT inhaler  Generic drug: albuterol sulfate HFA   VENTOLIN  (90 Base) MCG/ACT AERS             Stop These Medications      bumetanide 1 MG tablet  Commonly known as: BUMEX     metFORMIN 1000 MG tablet  Commonly known as: GLUCOPHAGE     methotrexate 2.5 MG tablet            No future appointments.  Follow-up Information       Provider, No Known Follow up.    Contact information:  [place] [id number] [phone]               Tono Finn, NP-C Follow up.    Specialty: Family Medicine  Why: Get follow-up lab with BMP in about a week.  Contact information:  [address] [place], [place] [id number] [phone]                   Discharge Order (From admission, onward)       Start     Ordered    11/06/20 1342  Discharge patient  Once     Expected Discharge Date: 11/06/20    Discharge Disposition: Home or Self Care    Physician of Record for Attribution - Please select from Treatment Team: LEXY SARMIENTO [[id number]]    Review needed by CMO to determine Physician of Record: No       Question Answer Comment   Physician of Record for Attribution - Please select from Treatment Team LEXY SARMIENTO    Review needed by CMO to determine Physician of Record No        11/06/20 1343                    Total time spent discharging patient including evaluation,post hospitalization follow up,  medication and post  hospitalization instructions and education total time exceeds 30 minutes.    Signed:  Malik Barnard MD  11/6/2020  13:44 EST